# Patient Record
Sex: FEMALE | Race: WHITE | NOT HISPANIC OR LATINO | Employment: UNEMPLOYED | URBAN - NONMETROPOLITAN AREA
[De-identification: names, ages, dates, MRNs, and addresses within clinical notes are randomized per-mention and may not be internally consistent; named-entity substitution may affect disease eponyms.]

---

## 2024-07-24 ENCOUNTER — TELEPHONE (OUTPATIENT)
Age: 24
End: 2024-07-24

## 2024-07-24 NOTE — TELEPHONE ENCOUNTER
Caller: Taya Leonard    Relationship: Self    Best call back number: [phone]    What is the best time to reach you: ANYTIME     Who are you requesting to speak with (clinical staff, provider,  specific staff member): MARI     Do you know the name of the person who called: MARI     What was the call regarding: UPCOMING APPT PT RETURNING MISSED CALL PLEASE CALL

## 2024-08-07 ENCOUNTER — INITIAL PRENATAL (OUTPATIENT)
Age: 24
End: 2024-08-07
Payer: COMMERCIAL

## 2024-08-07 VITALS — WEIGHT: 97.2 LBS | DIASTOLIC BLOOD PRESSURE: 60 MMHG | BODY MASS INDEX: 16.68 KG/M2 | SYSTOLIC BLOOD PRESSURE: 99 MMHG

## 2024-08-07 DIAGNOSIS — Z34.81 ENCOUNTER FOR SUPERVISION OF OTHER NORMAL PREGNANCY IN FIRST TRIMESTER: ICD-10-CM

## 2024-08-07 DIAGNOSIS — Z34.90 PREGNANCY WITH FETUS OF UNKNOWN GESTATIONAL AGE: Primary | ICD-10-CM

## 2024-08-07 PROCEDURE — G0123 SCREEN CERV/VAG THIN LAYER: HCPCS | Performed by: OBSTETRICS & GYNECOLOGY

## 2024-08-07 PROCEDURE — 87591 N.GONORRHOEAE DNA AMP PROB: CPT | Performed by: OBSTETRICS & GYNECOLOGY

## 2024-08-07 PROCEDURE — 87491 CHLMYD TRACH DNA AMP PROBE: CPT | Performed by: OBSTETRICS & GYNECOLOGY

## 2024-08-07 PROCEDURE — 87661 TRICHOMONAS VAGINALIS AMPLIF: CPT | Performed by: OBSTETRICS & GYNECOLOGY

## 2024-08-07 NOTE — PROGRESS NOTES
NEW OBSTETRIC HISTORY AND PHYSICAL     Subjective:  Taya Jackson is a 23 y.o.  at 8w1d with Estimated Date of Delivery: 3/18/25 here for her new OB visit. Patient pregnancy is dated by last menstrual period and confirmed with a first trimester ultrasound. She is taking her prenatal vitamins.Reports no loss of fluid or vaginal bleeding.No hx of genetic, bleeding, endocrine, chromosome disorder in both patient and partner. No history of multiple gestations, congenital anomalies or mental retardation.    Hx of STIs including Herpes: No  Hx of abnormal PAP smear: No      Discussed adequate water intake, food guidelines/weight gain, limit caffiene to less than 200mg daily.   Discussed food, activities to avoid. Discussed seatbelt safety.   Reviewed safe meds in pregnancy handout.  Taking PNV: Yes  Smoking cessation needed: No    Reviewed and updated:  OBHx, GYNHx (STDs), PMHx, Medications, Allergies, PSHx, Social Hx, Preventative Hx (PAP), Hx of abuse/safe environment, Vaccine Hx including hx of chickenpox or vaccine, Genetic Hx (pt, FOB, both families).        OB History    Para Term  AB Living   4 2 2   1 2   SAB IAB Ectopic Molar Multiple Live Births   1         2      # Outcome Date GA Lbr Chase/2nd Weight Sex Type Anes PTL Lv   4 Current            3 SAB 22 4w0d   U    FD   2 Term 20 38w0d  2722 g (6 lb) F Vag-Spont EPI N THU   1 Term 18 37w0d  2495 g (5 lb 8 oz) F Vag-Spont EPI N THU       Past Medical History:   Diagnosis Date    GERD (gastroesophageal reflux disease) 2023    Raynaud's phenomenon        Past Surgical History:   Procedure Laterality Date    COLONOSCOPY  2024    COLONOSCOPY N/A 3/26/2024    Procedure: COLONOSCOPY;  Surgeon: Darwin Cid MD;  Location: [facility] ENDOSCOPY;  Service: Gastroenterology;  Laterality: N/A;  NORMAL COLONOSCOPY    ENDOSCOPY  2024    ENDOSCOPY N/A 3/26/2024    Procedure: ESOPHAGOGASTRODUODENOSCOPY WITH  BIOPSIES;  Surgeon: Darwin Cid MD;  Location: [facility] ENDOSCOPY;  Service: Gastroenterology;  Laterality: N/A;  NORMAL ENDOSCOPY         Current Outpatient Medications:     omeprazole (priLOSEC) 20 MG capsule, Take 1 capsule by mouth Daily., Disp: 30 capsule, Rfl: 5    Family History   Problem Relation Age of Onset    Prostate cancer Paternal Grandfather     Colon cancer Maternal Grandfather     Breast cancer Neg Hx     Ovarian cancer Neg Hx     Uterine cancer Neg Hx        Allergies   Allergen Reactions    Amoxicillin Rash       Social History     Socioeconomic History    Marital status:     Number of children: 2   Tobacco Use    Smoking status: Never     Passive exposure: Never    Smokeless tobacco: Never   Vaping Use    Vaping status: Never Used   Substance and Sexual Activity    Alcohol use: Never    Drug use: Never    Sexual activity: Yes     Partners: Male     Birth control/protection: Condom       Last Completed Pap Smear            PAP SMEAR (Every 3 Years) Next due on 11/21/2026 11/21/2023  IGP,rfx Aptima HPV All Pth                      ROS:  General ROS: negative for - chills or fatigue  Gastrointestinal ROS: negative for - abdominal pain or appetite loss    Objective:    Vitals:    08/07/24 1536   BP: 99/60               Physical Exam:   General appearance - alert, well appearing, and in no distress  Respiratory- No labored breathing  Breasts-tender to palpation bilateral.  No discharge.  There is a small cystic mass in the left breast at about 1:00 which is tender to palpation and mobile.  Patient reports feeling the same mass during her last pregnancy.  Abdomen- Soft, Gravid uterus, non-tender  Extremeties: Normal ROM, Negative swelling     Pelvic exam with chaperone present:   External genitalia- Normal, no lesions  Urethra- Normal, no masses, non tender  Vagina- Normal, no discharge  Bladder- Normal, no masses, non tender  Cervix- Normal, no lesions, no discharge, no  CMT  Uterus- Normal shape and consistency, non tender.    Adnexa- Normal, no mass, non tender    Bedside Ultrasound:     Brief Urine Lab Results  (Last result in the past 365 days)        Color   Clarity   Blood   Leuk Est   Nitrite   Protein   CREAT   Urine HCG        03/26/24 0911               Negative                Counseling:   Nutrition discussed, calories, activity/exercise in pregnancy  Discussed dietary restrictions/safety food preparation in pregnancy  Reviewed what to expect prenatal visits, office providers (female and male) and covering [facility] Hospital[facility]/Dr. Garcia  Appropriate trimester precautions provided, N/V, vag bleeding, cramping  VACCINE importance in pregnancy discussed.  Maternal and fetal risk of not being vaccinated reviewed NLT increased risk maternal/fetal severity of illness/death, PTD, CS, hemorrhage, HTN, possible IUFD.  Significant maternal and fetal/infant benefit w vaccination.  FDA approval and ACOG/SMFM/CDC strong recommendation in pregnancy.  Questions answered.   Questions answered  Discussed healthy weight gain.  Also discussed increased water intake, 200mg of caffeine daily, exercise and healthy eating habits.  Encouraged patient to consider breastfeeding. Discussed that it is recommended by the CDC and WHO that women exclusively breastfeed for the first 6 months and continue to breastfeed up to one year. Discussed the health benefits of breastfeeding, including increased immune systems in infants, reduced risk of food allergies, and reduced risk of chronic disease as an adult. Maternal benefits include more PP weight loss, reduced risk of PP depression, breast/ovarian cancer risk reduced.     ASSESSMENT/PLAN:   Assessment & Plan  Pregnancy with fetus of unknown gestational age    Encounter for supervision of other normal pregnancy in first trimester    Orders Placed This Encounter   Procedures    US Ob < 14 Weeks Single or First Gestation                Return in about 4 weeks  (around 9/4/2024).    We have gone over the expected prenatal care to include the timing and content of visits including the anatomy ultrasound.  We discussed the content of the anatomy ultrasound and its limitations (the fact that ultrasound in general may not see up to 40% of abnormalities).  I informed her how to contact the office and/or on call person in the event of any problems and encouraged her to do so when she feels it is necessary.  We then spent time answering her questions which she indicated were answered to her satisfaction.    Cale Del Rio MD  8/7/2024 15:51 EDTNEW OBSTETRIC HISTORY AND PHYSICAL

## 2024-08-08 ENCOUNTER — LAB (OUTPATIENT)
Dept: LAB | Facility: HOSPITAL | Age: 24
End: 2024-08-08
Payer: COMMERCIAL

## 2024-08-08 LAB
ABO GROUP BLD: NORMAL
BASOPHILS # BLD AUTO: 0.01 10*3/MM3 (ref 0–0.2)
BASOPHILS NFR BLD AUTO: 0.2 % (ref 0–1.5)
BLD GP AB SCN SERPL QL: NEGATIVE
DEPRECATED RDW RBC AUTO: 43.4 FL (ref 37–54)
EOSINOPHIL # BLD AUTO: 0.01 10*3/MM3 (ref 0–0.4)
EOSINOPHIL NFR BLD AUTO: 0.2 % (ref 0.3–6.2)
ERYTHROCYTE [DISTWIDTH] IN BLOOD BY AUTOMATED COUNT: 12.9 % (ref 12.3–15.4)
HBV SURFACE AG SERPL QL IA: NORMAL
HCT VFR BLD AUTO: 43.9 % (ref 34–46.6)
HCV AB SER QL: NORMAL
HGB BLD-MCNC: 14.7 G/DL (ref 12–15.9)
HIV 1+2 AB+HIV1 P24 AG SERPL QL IA: NORMAL
IMM GRANULOCYTES # BLD AUTO: 0 10*3/MM3 (ref 0–0.05)
IMM GRANULOCYTES NFR BLD AUTO: 0 % (ref 0–0.5)
LYMPHOCYTES # BLD AUTO: 1.44 10*3/MM3 (ref 0.7–3.1)
LYMPHOCYTES NFR BLD AUTO: 30.5 % (ref 19.6–45.3)
MCH RBC QN AUTO: 30.4 PG (ref 26.6–33)
MCHC RBC AUTO-ENTMCNC: 33.5 G/DL (ref 31.5–35.7)
MCV RBC AUTO: 90.7 FL (ref 79–97)
MONOCYTES # BLD AUTO: 0.24 10*3/MM3 (ref 0.1–0.9)
MONOCYTES NFR BLD AUTO: 5.1 % (ref 5–12)
NEUTROPHILS NFR BLD AUTO: 3.02 10*3/MM3 (ref 1.7–7)
NEUTROPHILS NFR BLD AUTO: 64 % (ref 42.7–76)
PLATELET # BLD AUTO: 218 10*3/MM3 (ref 140–450)
PMV BLD AUTO: 10.5 FL (ref 6–12)
RBC # BLD AUTO: 4.84 10*6/MM3 (ref 3.77–5.28)
RH BLD: POSITIVE
WBC NRBC COR # BLD AUTO: 4.72 10*3/MM3 (ref 3.4–10.8)

## 2024-08-08 PROCEDURE — 86803 HEPATITIS C AB TEST: CPT | Performed by: OBSTETRICS & GYNECOLOGY

## 2024-08-08 PROCEDURE — 85025 COMPLETE CBC W/AUTO DIFF WBC: CPT | Performed by: OBSTETRICS & GYNECOLOGY

## 2024-08-08 PROCEDURE — 80081 OBSTETRIC PANEL INC HIV TSTG: CPT | Performed by: OBSTETRICS & GYNECOLOGY

## 2024-08-09 ENCOUNTER — PATIENT ROUNDING (BHMG ONLY) (OUTPATIENT)
Age: 24
End: 2024-08-09
Payer: COMMERCIAL

## 2024-08-09 LAB
RPR SER QL: NORMAL
T VAGINALIS RRNA SPEC QL NAA+PROBE: NEGATIVE

## 2024-08-09 NOTE — PROGRESS NOTES
".\"A [employer] message has been sent to the patient for PATIENT ROUNDING with [facility]\"  "

## 2024-08-10 LAB — RUBV IGG SERPL IA-ACNC: 2.1 INDEX

## 2024-08-13 ENCOUNTER — TELEPHONE (OUTPATIENT)
Dept: FAMILY MEDICINE CLINIC | Age: 24
End: 2024-08-13
Payer: COMMERCIAL

## 2024-09-04 ENCOUNTER — ROUTINE PRENATAL (OUTPATIENT)
Age: 24
End: 2024-09-04
Payer: COMMERCIAL

## 2024-09-04 VITALS — SYSTOLIC BLOOD PRESSURE: 120 MMHG | BODY MASS INDEX: 16.86 KG/M2 | WEIGHT: 98.2 LBS | DIASTOLIC BLOOD PRESSURE: 86 MMHG

## 2024-09-04 DIAGNOSIS — Z34.91 NORMAL PREGNANCY IN FIRST TRIMESTER: Primary | ICD-10-CM

## 2024-09-04 LAB — PROT UR STRIP-MCNC: ABNORMAL MG/DL

## 2024-09-04 NOTE — PROGRESS NOTES
Routine Prenatal Visit     Subjective  Taya Jackson is a 23 y.o.  at 12w1d here for her routine OB visit.   She is taking her prenatal vitamins.Reports no loss of fluid or vaginal bleeding. Patient doing well without any complaints. Pregnancy complicated by:     Patient Active Problem List   Diagnosis    Lymph node enlargement    Generalized abdominal pain    Gastroesophageal reflux disease without esophagitis    Other dysphagia    Upper abdominal pain    Screening for viral disease    Blood in stool    Rectal bleeding    Dyspepsia    Dysphagia    Globus sensation    Pain of upper abdomen    FH: colon cancer         OB History    Para Term  AB Living   4 2 2   1 2   SAB IAB Ectopic Molar Multiple Live Births   1         2      # Outcome Date GA Lbr Chase/2nd Weight Sex Type Anes PTL Lv   4 Current            3 SAB 22 4w0d   U    FD   2 Term 20 38w0d  2722 g (6 lb) F Vag-Spont EPI N THU   1 Term 18 37w0d  2495 g (5 lb 8 oz) F Vag-Spont EPI N THU       ROS:  General ROS: negative for - chills or fatigue  Genito-Urinary ROS: negative for  change in urinary stream, vaginal discharge     Objective  Physical Exam:   Vitals:    24 1407   BP: 120/86       Uterine Size: size equals dates  FHT: 110-160 BPM    General appearance - alert, well appearing, and in no distress  Abdomen- soft, gravid uterus, non-tender to palpation  Extremeties: negative swelling       Assessment/Plan  Assessment & Plan  Normal pregnancy in first trimester  Patient wants to wait on doing genetic blood testing until next visit.              Counseling:     Round Ligament Pain:  The uterus has several ligaments which provide support and keep the uterus in place. As the  uterus grows these ligaments are pulled and stretched which often causes sharp stabbing like pain in the inguinal area.   You may find a pregnancy support band helpful. Changing positions may also help. Yoga is a great way to cope  with round ligament and low back pain in pregnancy.    Massage may also help with low back pain   Things to Consider at this Point in your Pregnancy:  Some women experience swelling in their feet during pregnancy. Compression stockings may help  Drink plenty of water and stay active   Make sure you are eating frequent small meals, nuts are a wonderful snack to keep with you            Return in about 4 weeks (around 10/2/2024) for Routine OB visit.      We have gone over prenatal care to include the timing and content of visits. I informed her how to contact the office and/or on call person in the event of any problems and encouraged her to do so when she feels it is necessary.  We then spent time answering her questions which she indicated were answered to her satisfaction.    Cale Del Rio MD  9/4/2024 14:24 EDT

## 2024-09-06 ENCOUNTER — TELEPHONE (OUTPATIENT)
Age: 24
End: 2024-09-06
Payer: COMMERCIAL

## 2024-09-06 LAB
EXPIRATION DATE: NORMAL
Lab: NORMAL
PROT UR STRIP-MCNC: NEGATIVE MG/DL

## 2024-09-06 NOTE — TELEPHONE ENCOUNTER
Patient saw in her MyChart an abn. ur. result I explained we check for pro & glu on OB patients, hers had trace but Dr. Crespo had no concerns due to the small amount. She feels like she is getting a UTI. She is coming in for a full ur test.       Patient did come in and a urine test was run. It was negative.

## 2024-10-02 ENCOUNTER — ROUTINE PRENATAL (OUTPATIENT)
Age: 24
End: 2024-10-02
Payer: COMMERCIAL

## 2024-10-02 VITALS — WEIGHT: 99.5 LBS | DIASTOLIC BLOOD PRESSURE: 87 MMHG | BODY MASS INDEX: 17.08 KG/M2 | SYSTOLIC BLOOD PRESSURE: 123 MMHG

## 2024-10-02 DIAGNOSIS — Z34.92 NORMAL PREGNANCY IN SECOND TRIMESTER: Primary | ICD-10-CM

## 2024-10-02 LAB
CLARITY, POC: CLEAR
COLOR UR: YELLOW
GLUCOSE UR STRIP-MCNC: NEGATIVE MG/DL
PROT UR STRIP-MCNC: NEGATIVE MG/DL

## 2024-10-02 NOTE — PROGRESS NOTES
Routine Prenatal Visit     Subjective  Taya Leonard is a 24 y.o.  at 16w1d here for her routine OB visit.   She is taking her prenatal vitamins.Reports no loss of fluid or vaginal bleeding. Patient doing well without any complaints except for concerns about weight gain.  She reports that her caloric intake is good but is just not gaining weight.  She has gluten allergy and she is not able to eat bread. Pregnancy is complicated by:     Patient Active Problem List   Diagnosis    Lymph node enlargement    Generalized abdominal pain    Gastroesophageal reflux disease without esophagitis    Other dysphagia    Upper abdominal pain    Screening for viral disease    Blood in stool    Rectal bleeding    Dyspepsia    Dysphagia    Globus sensation    Pain of upper abdomen    FH: colon cancer         OB History    Para Term  AB Living   4 2 2   1 2   SAB IAB Ectopic Molar Multiple Live Births   1         2      # Outcome Date GA Lbr Chase/2nd Weight Sex Type Anes PTL Lv   4 Current            3 SAB 22 4w0d   U    FD   2 Term 20 38w0d  2722 g (6 lb) F Vag-Spont EPI N THU   1 Term 18 37w0d  2495 g (5 lb 8 oz) F Vag-Spont EPI N THU           ROS:   General ROS: negative for - chills or fatigue  Genito-Urinary ROS: negative for  change in urinary stream, vaginal discharge     Objective  Physical Exam:   Vitals:    10/02/24 1132   BP: 123/87       Uterine Size: size equals dates  FHT: 110-160 BPM    General appearance - alert, well appearing, and in no distress  Abdomen- Soft, Gravid uterus, non-tender to palpation  Extremeties: negative swelling       Assessment/Plan:   Assessment & Plan  Normal pregnancy in second trimester     I discussed with patient genetic testing including panorama and Horizon testing.  Patient wants to wait on those test until her insurance is confirmed.  Patient reports that she will not do anything if the testing is abnormal anyhow.         Counseling:      Round Ligament Pain:  The uterus has several ligaments which provide support and keep the uterus in place. As the  uterus grows these ligaments are pulled and stretched which often causes sharp stabbing like pain in the inguinal area.   You may find a pregnancy support band helpful. Changing positions may also help. Yoga is a great way to cope with round ligament and low back pain in pregnancy.    Massage may also help with low back pain   Things to Consider at this Point in your Pregnancy:  Some women experience swelling in their feet during pregnancy. Compression stockings may help  Drink plenty of water and stay active   Make sure you are eating frequent small meals, nuts are a wonderful snack to keep with you            Return in about 4 weeks (around 10/30/2024) for Routine OB visit.      We have gone over prenatal care to include the timing and content of visits. I informed her how to contact the office and/or on call person in the event of any problems and encouraged her to do so when she feels it is necessary.  We then spent time answering her questions which she indicated were answered to her satisfaction.    Cale Del Rio MD  10/2/2024 11:49 EDT

## 2024-10-11 ENCOUNTER — TELEPHONE (OUTPATIENT)
Age: 24
End: 2024-10-11
Payer: COMMERCIAL

## 2024-10-11 ENCOUNTER — HOSPITAL ENCOUNTER (EMERGENCY)
Facility: HOSPITAL | Age: 24
Discharge: HOME | End: 2024-10-11
Attending: EMERGENCY MEDICINE
Payer: COMMERCIAL

## 2024-10-11 ENCOUNTER — APPOINTMENT (OUTPATIENT)
Dept: ULTRASOUND IMAGING | Facility: HOSPITAL | Age: 24
End: 2024-10-11
Payer: COMMERCIAL

## 2024-10-11 VITALS
HEART RATE: 85 BPM | TEMPERATURE: 97.6 F | SYSTOLIC BLOOD PRESSURE: 105 MMHG | OXYGEN SATURATION: 98 % | RESPIRATION RATE: 18 BRPM | DIASTOLIC BLOOD PRESSURE: 76 MMHG | HEIGHT: 64 IN | BODY MASS INDEX: 17.46 KG/M2 | WEIGHT: 102.29 LBS

## 2024-10-11 DIAGNOSIS — R82.71 BACTERIA IN URINE: ICD-10-CM

## 2024-10-11 DIAGNOSIS — R10.9 RIGHT FLANK PAIN: Primary | ICD-10-CM

## 2024-10-11 LAB
ALBUMIN SERPL-MCNC: 3.3 G/DL (ref 3.5–5.2)
ALBUMIN/GLOB SERPL: 0.8 G/DL
ALP SERPL-CCNC: 72 U/L (ref 39–117)
ALT SERPL W P-5'-P-CCNC: 12 U/L (ref 1–33)
ANION GAP SERPL CALCULATED.3IONS-SCNC: 10.6 MMOL/L (ref 5–15)
AST SERPL-CCNC: 13 U/L (ref 1–32)
BACTERIA UR QL AUTO: ABNORMAL /HPF
BASOPHILS # BLD AUTO: 0.03 10*3/MM3 (ref 0–0.2)
BASOPHILS NFR BLD AUTO: 0.4 % (ref 0–1.5)
BILIRUB SERPL-MCNC: 0.2 MG/DL (ref 0–1.2)
BILIRUB UR QL STRIP: NEGATIVE
BUN SERPL-MCNC: 9 MG/DL (ref 6–20)
BUN/CREAT SERPL: 14.5 (ref 7–25)
CALCIUM SPEC-SCNC: 9.3 MG/DL (ref 8.6–10.5)
CHLORIDE SERPL-SCNC: 101 MMOL/L (ref 98–107)
CLARITY UR: CLEAR
CO2 SERPL-SCNC: 23.4 MMOL/L (ref 22–29)
COLOR UR: YELLOW
CREAT SERPL-MCNC: 0.62 MG/DL (ref 0.57–1)
DEPRECATED RDW RBC AUTO: 47.5 FL (ref 37–54)
EGFRCR SERPLBLD CKD-EPI 2021: 126.1 ML/MIN/1.73
EOSINOPHIL # BLD AUTO: 0.05 10*3/MM3 (ref 0–0.4)
EOSINOPHIL NFR BLD AUTO: 0.7 % (ref 0.3–6.2)
ERYTHROCYTE [DISTWIDTH] IN BLOOD BY AUTOMATED COUNT: 14.1 % (ref 12.3–15.4)
GLOBULIN UR ELPH-MCNC: 3.9 GM/DL
GLUCOSE SERPL-MCNC: 95 MG/DL (ref 65–99)
GLUCOSE UR STRIP-MCNC: NEGATIVE MG/DL
HCT VFR BLD AUTO: 41 % (ref 34–46.6)
HGB BLD-MCNC: 13.8 G/DL (ref 12–15.9)
HGB UR QL STRIP.AUTO: NEGATIVE
HOLD SPECIMEN: NORMAL
HYALINE CASTS UR QL AUTO: ABNORMAL /LPF
IMM GRANULOCYTES # BLD AUTO: 0.02 10*3/MM3 (ref 0–0.05)
IMM GRANULOCYTES NFR BLD AUTO: 0.3 % (ref 0–0.5)
KETONES UR QL STRIP: NEGATIVE
LEUKOCYTE ESTERASE UR QL STRIP.AUTO: ABNORMAL
LYMPHOCYTES # BLD AUTO: 1.76 10*3/MM3 (ref 0.7–3.1)
LYMPHOCYTES NFR BLD AUTO: 25.9 % (ref 19.6–45.3)
MCH RBC QN AUTO: 31.1 PG (ref 26.6–33)
MCHC RBC AUTO-ENTMCNC: 33.7 G/DL (ref 31.5–35.7)
MCV RBC AUTO: 92.3 FL (ref 79–97)
MONOCYTES # BLD AUTO: 0.42 10*3/MM3 (ref 0.1–0.9)
MONOCYTES NFR BLD AUTO: 6.2 % (ref 5–12)
NEUTROPHILS NFR BLD AUTO: 4.52 10*3/MM3 (ref 1.7–7)
NEUTROPHILS NFR BLD AUTO: 66.5 % (ref 42.7–76)
NITRITE UR QL STRIP: NEGATIVE
NRBC BLD AUTO-RTO: 0 /100 WBC (ref 0–0.2)
PH UR STRIP.AUTO: 6 [PH] (ref 5–8)
PLATELET # BLD AUTO: 239 10*3/MM3 (ref 140–450)
PMV BLD AUTO: 11.1 FL (ref 6–12)
POTASSIUM SERPL-SCNC: 4 MMOL/L (ref 3.5–5.2)
PROT SERPL-MCNC: 7.2 G/DL (ref 6–8.5)
PROT UR QL STRIP: NEGATIVE
RBC # BLD AUTO: 4.44 10*6/MM3 (ref 3.77–5.28)
RBC # UR STRIP: ABNORMAL /HPF
REF LAB TEST METHOD: ABNORMAL
SODIUM SERPL-SCNC: 135 MMOL/L (ref 136–145)
SP GR UR STRIP: <=1.005 (ref 1–1.03)
SQUAMOUS #/AREA URNS HPF: ABNORMAL /HPF
UROBILINOGEN UR QL STRIP: ABNORMAL
WBC # UR STRIP: ABNORMAL /HPF
WBC NRBC COR # BLD AUTO: 6.8 10*3/MM3 (ref 3.4–10.8)

## 2024-10-11 PROCEDURE — 76775 US EXAM ABDO BACK WALL LIM: CPT

## 2024-10-11 PROCEDURE — 36415 COLL VENOUS BLD VENIPUNCTURE: CPT

## 2024-10-11 PROCEDURE — 85025 COMPLETE CBC W/AUTO DIFF WBC: CPT | Performed by: EMERGENCY MEDICINE

## 2024-10-11 PROCEDURE — 99284 EMERGENCY DEPT VISIT MOD MDM: CPT

## 2024-10-11 PROCEDURE — 80053 COMPREHEN METABOLIC PANEL: CPT | Performed by: EMERGENCY MEDICINE

## 2024-10-11 PROCEDURE — 81001 URINALYSIS AUTO W/SCOPE: CPT | Performed by: EMERGENCY MEDICINE

## 2024-10-11 RX ORDER — ONDANSETRON 4 MG/1
4 TABLET, ORALLY DISINTEGRATING ORAL EVERY 8 HOURS PRN
Qty: 10 TABLET | Refills: 0 | Status: SHIPPED | OUTPATIENT
Start: 2024-10-11

## 2024-10-11 RX ORDER — CEPHALEXIN 500 MG/1
500 CAPSULE ORAL 4 TIMES DAILY
Qty: 28 CAPSULE | Refills: 0 | Status: SHIPPED | OUTPATIENT
Start: 2024-10-11 | End: 2024-10-18

## 2024-10-11 RX ADMIN — CEPHALEXIN 500 MG: 250 CAPSULE ORAL at 21:34

## 2024-10-11 NOTE — TELEPHONE ENCOUNTER
Provider: DR MCDONOUGH     Caller: RCISTINE GROSS    Relationship to Patient: SELF     Pharmacy: [facility]    Phone Number: [phone]    Reason for Call: PT IS ABOUT 17 WKS PREGNANT AND HAS BEEN HAVING BACK PAIN ON THE RIGHT SIDE SINCE TUESDAY IT HAS GRADUALLY GOTTEN WORSE IT HAS NOW EXTENDED LOWER DOWN INTO HER LOWER BACK PT IS NOT SURE IF IT COULD BE A KIDNEY INFECTION OR WHAT PT WOULD LIKE TO BE ADVISED

## 2024-10-11 NOTE — TELEPHONE ENCOUNTER
Spoke to patient that stated she has started to also have chills and is nauseas. Dr. Del Rio said for her to go to ER. Patient voiced understanding.

## 2024-10-11 NOTE — ED PROVIDER NOTES
Time: 7:05 PM EDT  Date of encounter:  10/11/2024  Independent Historian/Clinical History and Information was obtained by:   Patient    History is limited by: N/A    Chief Complaint   Patient presents with    Flank Pain         History of Present Illness:  Patient is a 24 y.o. year old female who presents to the emergency department for evaluation of flank pain.  Patient states the pain began Tuesday has increased in intensity.  Patient is also complaining of some constipation.  Patient is 17 weeks pregnant.  Patient denies any urological symptoms or vaginal bleeding or discharge.    Patient Care Team  Primary Care Provider: Rhina Leonard APRN    Past Medical History:     Allergies   Allergen Reactions    Amoxicillin Rash     Past Medical History:   Diagnosis Date    GERD (gastroesophageal reflux disease) Jan 2023    Raynaud's phenomenon      Past Surgical History:   Procedure Laterality Date    COLONOSCOPY  03/26/2024    COLONOSCOPY N/A 3/26/2024    Procedure: COLONOSCOPY;  Surgeon: Darwin Cid MD;  Location: [facility] ENDOSCOPY;  Service: Gastroenterology;  Laterality: N/A;  NORMAL COLONOSCOPY    ENDOSCOPY  03/26/2024    ENDOSCOPY N/A 3/26/2024    Procedure: ESOPHAGOGASTRODUODENOSCOPY WITH BIOPSIES;  Surgeon: Darwin Cid MD;  Location: [facility] ENDOSCOPY;  Service: Gastroenterology;  Laterality: N/A;  NORMAL ENDOSCOPY     Family History   Problem Relation Age of Onset    Prostate cancer Paternal Grandfather     Colon cancer Maternal Grandfather     Breast cancer Neg Hx     Ovarian cancer Neg Hx     Uterine cancer Neg Hx        Home Medications:  Prior to Admission medications    Medication Sig Start Date End Date Taking? Authorizing Provider   omeprazole (priLOSEC) 20 MG capsule Take 1 capsule by mouth Daily.  Patient not taking: Reported on 9/4/2024 3/26/24   Darwin Cid MD        Social History:   Social History     Tobacco Use    Smoking status: Never     Passive exposure: Never     "Smokeless tobacco: Never   Vaping Use    Vaping status: Never Used   Substance Use Topics    Alcohol use: Never    Drug use: Never         Review of Systems:  Review of Systems   Constitutional:  Negative for chills and fever.   Respiratory:  Negative for shortness of breath.    Cardiovascular:  Negative for chest pain.   Gastrointestinal:  Negative for abdominal pain, diarrhea, nausea and vomiting.   Genitourinary:  Positive for flank pain and frequency. Negative for dysuria, pelvic pain, vaginal bleeding and vaginal discharge.   Musculoskeletal:  Negative for back pain and neck pain.   Skin:  Negative for rash.   Neurological: Negative.    Hematological: Negative.    Psychiatric/Behavioral: Negative.     All other systems reviewed and are negative.       Physical Exam:  /78 (BP Location: Left arm, Patient Position: Sitting)   Pulse 88   Temp 97.6 °F (36.4 °C) (Oral)   Resp 17   Ht 162.6 cm (64\")   Wt 46.4 kg (102 lb 4.7 oz)   LMP 06/11/2024 (Exact Date)   SpO2 100%   BMI 17.56 kg/m²         Physical Exam  Vitals and nursing note reviewed.   Constitutional:       Appearance: Normal appearance.   HENT:      Head: Atraumatic.   Cardiovascular:      Rate and Rhythm: Normal rate and regular rhythm.      Pulses: Normal pulses.   Pulmonary:      Effort: Pulmonary effort is normal.      Breath sounds: Normal breath sounds.   Chest:      Chest wall: No tenderness.   Abdominal:      General: Bowel sounds are normal.      Palpations: Abdomen is soft.      Tenderness: There is no abdominal tenderness. There is no right CVA tenderness or left CVA tenderness.   Musculoskeletal:         General: Tenderness (right thoracic/ upper right lumbar soft tissue) present.      Cervical back: Normal range of motion.      Thoracic back: Tenderness present.        Back:    Skin:     General: Skin is warm and dry.   Neurological:      Mental Status: She is alert and oriented to person, place, and time.   Psychiatric:         " Mood and Affect: Mood normal.         Behavior: Behavior normal.                      Medical Decision Making:      Comorbidities that affect care:    GERD, Pregnancy    External Notes reviewed:    Previous Clinic Note: last visit oct 2 with OB/GYN routine 16-week 1 day visit      The following orders were placed and all results were independently analyzed by me:  Orders Placed This Encounter   Procedures    US Renal Bilateral    Comprehensive Metabolic Panel    Urinalysis With Microscopic If Indicated (No Culture) - Urine, Clean Catch    CBC Auto Differential    Urinalysis, Microscopic Only - Urine, Clean Catch    Monitor fetal heart tones    CBC & Differential    Extra Tubes    Gold Top - SST       Medications Given in the Emergency Department:  Medications   cephalexin (KEFLEX) capsule 500 mg (has no administration in time range)        ED Course:    The patient was initially evaluated in the triage area where orders were placed. The patient was later dispositioned by MICHAEL Goel.      The patient was advised to stay for completion of workup which includes but is not limited to communication of labs and radiological results, reassessment and plan. The patient was advised that leaving prior to disposition by a provider could result in critical findings that are not communicated to the patient.     ED Course as of 10/11/24 2118   Fri Oct 11, 2024   1908 PROVIDER IN TRIAGE  Patient was evaluated by me in triage, Bailey Seaver, APRN, VIJAY.  Orders were placed and patient is currently awaiting final results and disposition.   [AS]   1943 I offer the patient Tylenol or other pregnancy safe medication for pain but she has declined at this time.  I advised her to let me know if she changes her mind [DS]      ED Course User Index  [AS] Seaver, Alyce B, APRN  [DS] Emma Stone APRN       Labs:    Lab Results (last 24 hours)       Procedure Component Value Units Date/Time    CBC & Differential [[id number]]  (Normal)  Collected: 10/11/24 1911    Specimen: Blood from Arm, Right Updated: 10/11/24 1932    Narrative:      The following orders were created for panel order CBC & Differential.  Procedure                               Abnormality         Status                     ---------                               -----------         ------                     CBC Auto Differential[[id number]]        Normal              Final result                 Please view results for these tests on the individual orders.    Comprehensive Metabolic Panel [[id number]]  (Abnormal) Collected: 10/11/24 1911    Specimen: Blood from Arm, Right Updated: 10/11/24 1957     Glucose 95 mg/dL      BUN 9 mg/dL      Creatinine 0.62 mg/dL      Sodium 135 mmol/L      Potassium 4.0 mmol/L      Chloride 101 mmol/L      CO2 23.4 mmol/L      Calcium 9.3 mg/dL      Total Protein 7.2 g/dL      Albumin 3.3 g/dL      ALT (SGPT) 12 U/L      AST (SGOT) 13 U/L      Alkaline Phosphatase 72 U/L      Total Bilirubin 0.2 mg/dL      Globulin 3.9 gm/dL      A/G Ratio 0.8 g/dL      BUN/Creatinine Ratio 14.5     Anion Gap 10.6 mmol/L      eGFR 126.1 mL/min/1.73     Narrative:      GFR Normal >60  Chronic Kidney Disease <60  Kidney Failure <15      CBC Auto Differential [[id number]]  (Normal) Collected: 10/11/24 1911    Specimen: Blood from Arm, Right Updated: 10/11/24 1932     WBC 6.80 10*3/mm3      RBC 4.44 10*6/mm3      Hemoglobin 13.8 g/dL      Hematocrit 41.0 %      MCV 92.3 fL      MCH 31.1 pg      MCHC 33.7 g/dL      RDW 14.1 %      RDW-SD 47.5 fl      MPV 11.1 fL      Platelets 239 10*3/mm3      Neutrophil % 66.5 %      Lymphocyte % 25.9 %      Monocyte % 6.2 %      Eosinophil % 0.7 %      Basophil % 0.4 %      Immature Grans % 0.3 %      Neutrophils, Absolute 4.52 10*3/mm3      Lymphocytes, Absolute 1.76 10*3/mm3      Monocytes, Absolute 0.42 10*3/mm3      Eosinophils, Absolute 0.05 10*3/mm3      Basophils, Absolute 0.03 10*3/mm3      Immature Grans, Absolute 0.02 10*3/mm3       nRBC 0.0 /100 WBC     Urinalysis With Microscopic If Indicated (No Culture) - Urine, Clean Catch [[id number]]  (Abnormal) Collected: 10/11/24 1915    Specimen: Urine, Clean Catch Updated: 10/11/24 1937     Color, UA Yellow     Appearance, UA Clear     pH, UA 6.0     Specific Gravity, UA <=1.005     Glucose, UA Negative     Ketones, UA Negative     Bilirubin, UA Negative     Blood, UA Negative     Protein, UA Negative     Leuk Esterase, UA Trace     Nitrite, UA Negative     Urobilinogen, UA 0.2 E.U./dL    Urinalysis, Microscopic Only - Urine, Clean Catch [[id number]]  (Abnormal) Collected: 10/11/24 1915    Specimen: Urine, Clean Catch Updated: 10/11/24 1950     RBC, UA None Seen /HPF      WBC, UA 0-2 /HPF      Bacteria, UA Trace /HPF      Squamous Epithelial Cells, UA 0-2 /HPF      Hyaline Casts, UA 0-2 /LPF      Methodology Manual Light Microscopy             Imaging:    US Renal Bilateral    Result Date: 10/11/2024  US RENAL BILATERAL Date of Exam: 10/11/2024 8:12 PM EDT Indication: right flank pain. Comparison: No comparisons available. Technique: Grayscale and color Doppler ultrasound evaluation of the kidneys and urinary bladder was performed. Findings: Right kidney measures 11.3 x 4.3 x 3.5 cm and the left 11.3 x 4 x 4 cm. Mild dilation of the right renal pelvis and calyces. Minimal prominence of the left renal pelvis. No solid-appearing masses or echogenic shadowing stone. No bladder wall thickening or layering intraluminal debris. Incidental mild increased hepatic echogenicity, suggestive but not diagnostic of steatosis.     Impression: 1. Mild right hydronephrosis which may relate to pregnancy. 2. Minimal left pelviectasis without overt hydronephrosis. Electronically Signed: Jus Dewitt MD  10/11/2024 8:54 PM EDT  Workstation ID: [id number]       Differential Diagnosis and Discussion:      Flank Pain: Differential diagnosis includes but is not limited to kidney stones, pyelonephritis, musculoskeletal  disorders, renal infarction, urinary tract infection, hydronephrosis, radiculopathy, aortic aneurysm, renal cell carcinoma.    All labs were reviewed and interpreted by me.  Ultrasound impression was interpreted by me.     MDM  Number of Diagnoses or Management Options  Bacteria in urine  Right flank pain  Diagnosis management comments: I have explained the patient´s condition, diagnoses and treatment plan based on the information available to me at this time. I have answered questions and addressed any concerns. The patient has a good  understanding of the patient´s diagnosis, condition, and treatment plan as can be expected at this point. The vital signs have been stable. The patient´s condition is stable and appropriate for discharge from the emergency department.      The patient will pursue further outpatient evaluation with the primary care physician or other designated or consulting physician as outlined in the discharge instructions. They are agreeable to this plan of care and follow-up instructions have been explained in detail. The patient has received these instructions in written format and have expressed an understanding of the discharge instructions. The patient is aware that any significant change in condition or worsening of symptoms should prompt an immediate return to this or the closest emergency department or call to 911.       Amount and/or Complexity of Data Reviewed  Clinical lab tests: reviewed and ordered  Tests in the radiology section of CPT®: reviewed and ordered  Tests in the medicine section of CPT®: ordered and reviewed    Risk of Complications, Morbidity, and/or Mortality  Presenting problems: moderate  Diagnostic procedures: moderate  Management options: low    Patient Progress  Patient progress: stable           Patient Care Considerations:    CT ABDOMEN AND PELVIS: I considered ordering a CT scan of the abdomen and pelvis however the risk of exposure of radiation to the fetus does  not exceed the benefits.  Patient does not appear to be in significant pain and has no hematuria and has normal lab work did not indicate any signs of infection or obstruction      Consultants/Shared Management Plan:    None    Social Determinants of Health:    Patient is independent, reliable, and has access to care.       Disposition and Care Coordination:    Discharged: The patient is suitable and stable for discharge with no need for consideration of admission.    I have explained the patient´s condition, diagnoses and treatment plan based on the information available to me at this time. I have answered questions and addressed any concerns. The patient has a good  understanding of the patient´s diagnosis, condition, and treatment plan as can be expected at this point. The vital signs have been stable. The patient´s condition is stable and appropriate for discharge from the emergency department.      The patient will pursue further outpatient evaluation with the primary care physician or other designated or consulting physician as outlined in the discharge instructions. They are agreeable to this plan of care and follow-up instructions have been explained in detail. The patient has received these instructions in written format and has expressed an understanding of the discharge instructions. The patient is aware that any significant change in condition or worsening of symptoms should prompt an immediate return to this or the closest emergency department or call to 911.  I have explained discharge medications and the need for follow up with the patient/caretakers. This was also printed in the discharge instructions. Patient was discharged with the following medications and follow up:      Medication List        New Prescriptions      cephalexin 500 MG capsule  Commonly known as: KEFLEX  Take 1 capsule by mouth 4 (Four) Times a Day for 7 days.     ondansetron ODT 4 MG disintegrating tablet  Commonly known as:  ZOFRAN-ODT  Place 1 tablet on the tongue Every 8 (Eight) Hours As Needed for Nausea or Vomiting.               Where to Get Your Medications        These medications were sent to [facility] [id number] - [place], [place] - [address] - [phone]  - [phone]  [address], [facility] [address]      Phone: [phone]   cephalexin 500 MG capsule  ondansetron ODT 4 MG disintegrating tablet      Cale Del Rio MD  [address] [place] [id number] [phone]    Schedule an appointment as soon as possible for a visit          Final diagnoses:   Right flank pain   Bacteria in urine        ED Disposition       ED Disposition   Discharge    Condition   Stable    Comment   --               This medical record created using voice recognition software.             Emma Stone, APRN  10/11/24 3390

## 2024-10-12 NOTE — DISCHARGE INSTRUCTIONS
Drink plenty of fluids.    Tylenol for pain.    Moist heat to affected area may help in case the pain is musculoskeletal as we talked about.    Return for new or worsening symptoms including but not limited to development of fever, intractable pain, or inability to tolerate anything by mouth

## 2024-10-14 ENCOUNTER — TELEPHONE (OUTPATIENT)
Age: 24
End: 2024-10-14
Payer: COMMERCIAL

## 2024-10-14 NOTE — TELEPHONE ENCOUNTER
CRISTINE GROSS    [phone]    PT IS 18wks    PT WAS IN THE ER 10/11/24 (NOTES IN CHART) WAS GIVEN CEPHALEXIN  4x A DAY FOR 7 DAYS    WAS GIVEN 2WHILE IN ER     WANTS TO MAKE ITS SAFE TO TAKE. PT DID TAKE 1 THIS MORNING

## 2024-10-15 ENCOUNTER — TELEPHONE (OUTPATIENT)
Age: 24
End: 2024-10-15

## 2024-10-15 NOTE — TELEPHONE ENCOUNTER
Hub staff attempted to follow warm transfer process and was unsuccessful     Caller: Taya Leonard    Relationship to patient: Self    Best call back number: [phone] (home)       Patient is needing: PT CALLING IN, WENT TO ER FOR UTI WAS GIVEN CEPHALEXIN AND WAS TOLD TO FOLLOW UP FOR NIKITA WITH OBGYN. HUB UNABLE TO WT TO CLARIFY TIME FRAME. PT STARTED TAKING THEM YESTERDAY AND WAS GIVEN A WEEK'S SUPPLY. OK TO RESPOND THROUGH [facility]

## 2024-11-06 ENCOUNTER — ROUTINE PRENATAL (OUTPATIENT)
Age: 24
End: 2024-11-06
Payer: COMMERCIAL

## 2024-11-06 VITALS — WEIGHT: 107.8 LBS | SYSTOLIC BLOOD PRESSURE: 119 MMHG | DIASTOLIC BLOOD PRESSURE: 75 MMHG | BODY MASS INDEX: 18.5 KG/M2

## 2024-11-06 DIAGNOSIS — Z34.92 NORMAL PREGNANCY IN SECOND TRIMESTER: Primary | ICD-10-CM

## 2024-11-06 LAB
GLUCOSE UR STRIP-MCNC: NEGATIVE MG/DL
PROT UR STRIP-MCNC: NEGATIVE MG/DL

## 2024-11-06 RX ORDER — PRENATAL VIT NO.126/IRON/FOLIC 28MG-0.8MG
1 TABLET ORAL DAILY
COMMUNITY

## 2024-11-06 NOTE — PROGRESS NOTES
Routine Prenatal Visit     Subjective  Taya Leonard is a 24 y.o.  at 21w1d here for her routine OB visit.   She is taking her prenatal vitamins.Reports no loss of fluid or vaginal bleeding. Patient doing well without any complaints. Pregnancy is complicated by:     Patient Active Problem List   Diagnosis    Lymph node enlargement    Generalized abdominal pain    Gastroesophageal reflux disease without esophagitis    Other dysphagia    Upper abdominal pain    Screening for viral disease    Blood in stool    Rectal bleeding    Dyspepsia    Dysphagia    Globus sensation    Pain of upper abdomen    FH: colon cancer         OB History    Para Term  AB Living   4 2 2   1 2   SAB IAB Ectopic Molar Multiple Live Births   1         2      # Outcome Date GA Lbr Chase/2nd Weight Sex Type Anes PTL Lv   4 Current            3 SAB 22 4w0d   U    FD   2 Term 20 38w0d  2722 g (6 lb) F Vag-Spont EPI N THU   1 Term 18 37w0d  2495 g (5 lb 8 oz) F Vag-Spont EPI N THU           ROS:  General ROS: negative for - chills or fatigue  Genito-Urinary ROS: negative for  change in urinary stream, vaginal discharge     Objective  Physical Exam:   Vitals:    24 1034   BP: 119/75       Uterine Size: size equals dates  FHT: 110-160 BPM    General appearance - alert, well appearing, and in no distress  Abdomen- Soft, Gravid uterus, non-tender to palpation  Extremeties: negative swelling       Assessment/Plan:   Assessment & Plan  Normal pregnancy in second trimester  Patient is here for follow-up OB visit.  She did not have a genetic screening done previously.  She declined maternal serum alpha-fetoprotein but desires panorama and Horizon.  Orders:    US Ob Detail Fetal Anatomy Single or First Gestation; Future    [facility] Panorama Prenatal Test: Chromosomes 13, 18, 21, X & Y: Triploidy 22Q.11.2 Deletion - Blood,; Future    [facility] Horizon 14 (Pan-Ethnic Standard) - Blood,; Future      Counseling:      Round Ligament Pain:  The uterus has several ligaments which provide support and keep the uterus in place. As the  uterus grows these ligaments are pulled and stretched which often causes sharp stabbing like pain in the inguinal area.   You may find a pregnancy support band helpful. Changing positions may also help. Yoga is a great way to cope with round ligament and low back pain in pregnancy.    Massage may also help with low back pain   Things to Consider at this Point in your Pregnancy:  Some women experience swelling in their feet during pregnancy. Compression stockings may help  Drink plenty of water and stay active   Make sure you are eating frequent small meals, nuts are a wonderful snack to keep with you            Return in about 4 weeks (around 12/4/2024) for Routine OB visit.      We have gone over prenatal care to include the timing and content of visits. I informed her how to contact the office and/or on call person in the event of any problems and encouraged her to do so when she feels it is necessary.  We then spent time answering her questions which she indicated were answered to her satisfaction.    Cale Del Rio MD  11/6/2024 10:50 EST

## 2024-11-19 ENCOUNTER — TELEPHONE (OUTPATIENT)
Age: 24
End: 2024-11-19
Payer: COMMERCIAL

## 2024-11-19 NOTE — TELEPHONE ENCOUNTER
Spoke to patient to let her know that she will need to call her insurance to see if it will cover the Prenatal testing [facility] and Nelda. If it does not cover the testing the company has a  form that she can fill out to help with the cost or cover the cost completely. Patient said she will call her insurance.She asked if it was required and I let her know that  providers have these test performed on all new OB's. She voiced understanding and had no other questions.

## 2024-11-19 NOTE — TELEPHONE ENCOUNTER
Attempted to call patient with the CPT codes for the lab testing:  Z31.430  Z84.81  Z81.0  Left a voicemail to call the office for additional information.

## 2024-11-19 NOTE — TELEPHONE ENCOUNTER
Caller: Taya Leonard    Relationship to patient: Self    Best call back number: [phone] (home)       Patient is needing: PT HAS PANORAMA PRENATAL AND HORIZON TESTING SCHEDULED AND IS WANTING TO KNOW IF INSURANCE COVERS THIS. IF IT DOESN'T, SHE WANTS TO KNOW IF THIS IS REQUIRED.

## 2024-11-19 NOTE — TELEPHONE ENCOUNTER
Caller: Taya Leonard     Relationship to patient: Self     Best call back number: [phone] (home)       Patient is needing: PT CALLED BACK STATING THAT HER INSURANCE IN NEEDING THE CPT CODING FOR THESE TEST IN ORDER TO GIVE HER THE COST    PLEASE CALL THE PT TO LET HER KNOW

## 2024-11-22 ENCOUNTER — TELEPHONE (OUTPATIENT)
Age: 24
End: 2024-11-22
Payer: COMMERCIAL

## 2024-11-22 NOTE — TELEPHONE ENCOUNTER
LVM - Dr. Del Rio said she does not have to have the test if she doesn't want to. Also, Tian offers fin. asst. if she would like information on that.

## 2024-12-04 ENCOUNTER — ROUTINE PRENATAL (OUTPATIENT)
Age: 24
End: 2024-12-04
Payer: COMMERCIAL

## 2024-12-04 VITALS — SYSTOLIC BLOOD PRESSURE: 105 MMHG | DIASTOLIC BLOOD PRESSURE: 76 MMHG | WEIGHT: 114.6 LBS | BODY MASS INDEX: 19.67 KG/M2

## 2024-12-04 DIAGNOSIS — O44.40 LOW-LYING PLACENTA: ICD-10-CM

## 2024-12-04 DIAGNOSIS — Z34.92 NORMAL PREGNANCY IN SECOND TRIMESTER: Primary | ICD-10-CM

## 2024-12-04 LAB
GLUCOSE UR STRIP-MCNC: NEGATIVE MG/DL
PROT UR STRIP-MCNC: NEGATIVE MG/DL

## 2024-12-04 NOTE — PROGRESS NOTES
Routine Prenatal Visit     Subjective  Taya Leonard is a 24 y.o.  at 25w1d here for her routine OB visit.   She is taking her prenatal vitamins.Reports no loss of fluid or vaginal bleeding. Patient doing well without any complaints except for episodes of fast heart rate.  I discussed with patient that these episodes happen frequently then we may have to consider a cardiology consult.. Pregnancy is complicated by:     Patient Active Problem List   Diagnosis    Lymph node enlargement    Generalized abdominal pain    Gastroesophageal reflux disease without esophagitis    Other dysphagia    Upper abdominal pain    Screening for viral disease    Blood in stool    Rectal bleeding    Dyspepsia    Dysphagia    Globus sensation    Pain of upper abdomen    FH: colon cancer    Low-lying placenta         OB History    Para Term  AB Living   4 2 2   1 2   SAB IAB Ectopic Molar Multiple Live Births   1         2      # Outcome Date GA Lbr Chase/2nd Weight Sex Type Anes PTL Lv   4 Current            3 SAB 22 4w0d   U    FD   2 Term 20 38w0d  2722 g (6 lb) F Vag-Spont EPI N THU   1 Term 18 37w0d  2495 g (5 lb 8 oz) F Vag-Spont EPI N THU           ROS:  General ROS: negative for - chills or fatigue  Genito-Urinary ROS: negative for  change in urinary stream, vaginal discharge     Objective  Physical Exam:   Vitals:    24 1409   BP: 105/76       Uterine Size: size equals dates  FHT: 110-160 BPM    General appearance - alert, well appearing, and in no distress  Abdomen- Soft, Gravid uterus, non-tender to palpation  Extremeties: negative swelling       Assessment/Plan:   Assessment & Plan  Normal pregnancy in second trimester    Orders:    POC Urinalysis Dipstick    Low-lying placenta    Orders:    US OB Limited 1 + Fetuses; Future      Counseling:     Round Ligament Pain:  The uterus has several ligaments which provide support and keep the uterus in place. As the  uterus grows  these ligaments are pulled and stretched which often causes sharp stabbing like pain in the inguinal area.   You may find a pregnancy support band helpful. Changing positions may also help. Yoga is a great way to cope with round ligament and low back pain in pregnancy.    Massage may also help with low back pain   Things to Consider at this Point in your Pregnancy:  Some women experience swelling in their feet during pregnancy. Compression stockings may help  Drink plenty of water and stay active   Make sure you are eating frequent small meals, nuts are a wonderful snack to keep with you            No follow-ups on file.      We have gone over prenatal care to include the timing and content of visits. I informed her how to contact the office and/or on call person in the event of any problems and encouraged her to do so when she feels it is necessary.  We then spent time answering her questions which she indicated were answered to her satisfaction.    Cale Del Rio MD  12/4/2024 14:29 EST

## 2024-12-27 ENCOUNTER — ROUTINE PRENATAL (OUTPATIENT)
Age: 24
End: 2024-12-27
Payer: COMMERCIAL

## 2024-12-27 VITALS — SYSTOLIC BLOOD PRESSURE: 102 MMHG | WEIGHT: 116.2 LBS | DIASTOLIC BLOOD PRESSURE: 54 MMHG | BODY MASS INDEX: 19.95 KG/M2

## 2024-12-27 DIAGNOSIS — Z36.89 ULTRASOUND SCAN TO EVALUATE PLACENTA LOCATION: ICD-10-CM

## 2024-12-27 DIAGNOSIS — O44.40 LOW-LYING PLACENTA: ICD-10-CM

## 2024-12-27 DIAGNOSIS — Z3A.28 28 WEEKS GESTATION OF PREGNANCY: Primary | ICD-10-CM

## 2024-12-27 DIAGNOSIS — Z13.1 SCREENING FOR DIABETES MELLITUS: ICD-10-CM

## 2024-12-27 LAB
DEPRECATED RDW RBC AUTO: 37.9 FL (ref 37–54)
ERYTHROCYTE [DISTWIDTH] IN BLOOD BY AUTOMATED COUNT: 11.9 % (ref 12.3–15.4)
GLUCOSE UR STRIP-MCNC: NEGATIVE MG/DL
HCT VFR BLD AUTO: 34.8 % (ref 34–46.6)
HGB BLD-MCNC: 11.6 G/DL (ref 12–15.9)
MCH RBC QN AUTO: 29.6 PG (ref 26.6–33)
MCHC RBC AUTO-ENTMCNC: 33.3 G/DL (ref 31.5–35.7)
MCV RBC AUTO: 88.8 FL (ref 79–97)
PLATELET # BLD AUTO: 214 10*3/MM3 (ref 140–450)
PMV BLD AUTO: 11.5 FL (ref 6–12)
PROT UR STRIP-MCNC: NEGATIVE MG/DL
RBC # BLD AUTO: 3.92 10*6/MM3 (ref 3.77–5.28)
WBC NRBC COR # BLD AUTO: 7.52 10*3/MM3 (ref 3.4–10.8)

## 2024-12-27 PROCEDURE — 86592 SYPHILIS TEST NON-TREP QUAL: CPT

## 2024-12-27 PROCEDURE — 82950 GLUCOSE TEST: CPT

## 2024-12-27 PROCEDURE — 85027 COMPLETE CBC AUTOMATED: CPT

## 2024-12-27 NOTE — PROGRESS NOTES
Chief Complaint   Patient presents with    Routine Prenatal Visit     OB Check, Pt is 28w3d, Placenta Check U/S today, 1 HR GTT today, Pt declines Tdap vaccine today, Breast pump order faxed today for patient, Pt has no complaints today       HPI: 24 y.o.  at 28w3d doing well  Good fm  No lof or vaginal bleeding  Occasional cyndie vinson that resolve    Relevant data reviewed: US Ob Detail Fetal Anatomy Single or First Gestation (2024 10:19)  US OB Limited 1 + Fetuses (2024 13:28)    Last OB US Data (since 6/10/2024)       None          Vitals:    24 1322   BP: 102/54   Weight: 52.7 kg (116 lb 3.2 oz)     Total weight gain for pregnancy:  7.348 kg (16 lb 3.2 oz)    Cx exam:   / /   Presentation: Vertex    Review of systems:   Gen: negative  CV:     negative  GI: negative  :   negative and good fetal movement noted   MS:    negative  Neuro: negative  Pul: negative    Physical Exam  Constitutional:       General: She is not in acute distress.  Pulmonary:      Effort: Pulmonary effort is normal.   Abdominal:      Palpations: Abdomen is soft.      Tenderness: There is no abdominal tenderness.   Skin:     General: Skin is warm.   Neurological:      Mental Status: She is alert.   Psychiatric:         Mood and Affect: Mood normal.         Thought Content: Thought content normal.         Judgment: Judgment normal.         A/P  1. Intrauterine pregnancy at 28w3d        Diagnoses and all orders for this visit:    1. 28 weeks gestation of pregnancy (Primary)  -     CBC (No Diff)  -     POC Urinalysis Dipstick  -     RPR, Rfx Qn RPR / Confirm TP ([facility])    2. Ultrasound scan to evaluate placenta location    3. Screening for diabetes mellitus  -     Gestational Screen 1 Hr ([facility])  -     CBC (No Diff)  -     RPR, Rfx Qn RPR / Confirm TP ([facility])    4. Low-lying placenta         labor was discussed.  Warnings were provided.  Nutrition and weight gain were  addressed.  -----------------------  PLAN: discussed US low lying placenta has improved slightly from 1.14 to 1.75 cm  Plan to repeat at 32 weeks  Avoid IC and lifting heavy items > 20 lbs  Any vb go to l/d  1hr gtt today  Declined tdap  No follow-ups on file.    Kira Gonzales, APRN  12/27/2024 13:44 EST

## 2024-12-28 LAB
GLUCOSE 1H P 50 G GLC PO SERPL-MCNC: 145 MG/DL (ref 70–139)
RPR SER QL: NON REACTIVE

## 2024-12-31 ENCOUNTER — TELEPHONE (OUTPATIENT)
Age: 24
End: 2024-12-31

## 2024-12-31 ENCOUNTER — CLINICAL SUPPORT (OUTPATIENT)
Age: 24
End: 2024-12-31
Payer: COMMERCIAL

## 2024-12-31 DIAGNOSIS — R39.9 UTI SYMPTOMS: Primary | ICD-10-CM

## 2024-12-31 DIAGNOSIS — R30.0 DYSURIA: ICD-10-CM

## 2024-12-31 DIAGNOSIS — R35.0 URINARY FREQUENCY: Primary | ICD-10-CM

## 2024-12-31 LAB
BILIRUB BLD-MCNC: NEGATIVE MG/DL
CLARITY, POC: CLEAR
COLOR UR: YELLOW
GLUCOSE UR STRIP-MCNC: NEGATIVE MG/DL
KETONES UR QL: NEGATIVE
LEUKOCYTE EST, POC: NEGATIVE
NITRITE UR-MCNC: NEGATIVE MG/ML
PH UR: 6 [PH] (ref 5–8)
PROT UR STRIP-MCNC: NEGATIVE MG/DL
RBC # UR STRIP: NEGATIVE /UL
SP GR UR: 1 (ref 1–1.03)
UROBILINOGEN UR QL: NORMAL

## 2024-12-31 PROCEDURE — 87086 URINE CULTURE/COLONY COUNT: CPT

## 2024-12-31 RX ORDER — NITROFURANTOIN 25; 75 MG/1; MG/1
100 CAPSULE ORAL 2 TIMES DAILY
Qty: 10 CAPSULE | Refills: 0 | Status: SHIPPED | OUTPATIENT
Start: 2024-12-31 | End: 2025-01-02

## 2024-12-31 NOTE — TELEPHONE ENCOUNTER
Caller: Taya Leonard    Relationship: Self    Best call back number:[phone]      Who are you requesting to speak with (clinical staff, DR. MCDONOUGH  OR MEY    What was the call regarding: PATIENT WAS RETURNING A CALL TO MEY.

## 2024-12-31 NOTE — TELEPHONE ENCOUNTER
Ob patient calling stating she is having pelvic pressure, cramping and burning/incontinence while urinating. Pt stating she has had these symptoms since Friday and started having lower back pain on Sunday. Pt reports she was around family during kishan who has the Flu. Please advise

## 2025-01-01 LAB — BACTERIA SPEC AEROBE CULT: NO GROWTH

## 2025-01-07 ENCOUNTER — TELEPHONE (OUTPATIENT)
Age: 25
End: 2025-01-07
Payer: COMMERCIAL

## 2025-01-07 DIAGNOSIS — Z13.1 SPECIAL SCREENING EXAMINATION FOR DIABETES MELLITUS: Primary | ICD-10-CM

## 2025-01-07 NOTE — TELEPHONE ENCOUNTER
Pt had to cancel 3 HR GTT today, Called  Lab and rescheduled patient for 01/14/2025 @ 9:00 Am. Pt notified and agrees this time/date works for her.

## 2025-01-09 ENCOUNTER — TELEPHONE (OUTPATIENT)
Age: 25
End: 2025-01-09
Payer: COMMERCIAL

## 2025-01-09 RX ORDER — FERROUS SULFATE 325(65) MG
325 TABLET ORAL
Qty: 30 TABLET | Refills: 1 | Status: SHIPPED | OUTPATIENT
Start: 2025-01-09

## 2025-01-09 NOTE — TELEPHONE ENCOUNTER
Caller: Taya Leonard    Relationship: Self    Best call back number: [phone] CALL ANYTIME.    Caller requesting test results: PATIENT    What test was performed: LABS     When was the test performed: 12/27/24    Where was the test performed: IN OFFICE    Additional notes: OB PATIENT IS REQUESTING TO SPEAK WITH CLINICAL TO DISCUSS TEST RESULTS. PATIENT IS CONCERNED IF HAVING SYMPTOMS OF LOW IRON.    CRAVING ICE, SMELL OF SOAP AND CLEANING SUPPLIES. HAS URGE TO TASTE SOAP AND CLEANING SUPPLIES. OCCASIONAL FASTER HEART RATE.

## 2025-01-09 NOTE — TELEPHONE ENCOUNTER
Spoke to patient she voiced understanding MICHAEL Amador is sending a prescription for iron to her RX. Follow instructions, be aware it can cause constipation and or dark stool. She has appt.  Wednesday w/Dr. Del Rio.

## 2025-01-14 ENCOUNTER — LAB (OUTPATIENT)
Facility: HOSPITAL | Age: 25
End: 2025-01-14
Payer: COMMERCIAL

## 2025-01-14 DIAGNOSIS — Z13.1 SPECIAL SCREENING EXAMINATION FOR DIABETES MELLITUS: ICD-10-CM

## 2025-01-14 LAB
GLUCOSE BLDC GLUCOMTR-MCNC: 67 MG/DL (ref 70–99)
GLUCOSE P FAST SERPL-MCNC: 76 MG/DL (ref 65–94)
GTT GEST 2H PNL UR+SERPL: 172 MG/DL (ref 65–179)
GTT GEST 3H PNL SERPL: 173 MG/DL (ref 65–139)
GTT GEST 3H PNL SERPL: 178 MG/DL (ref 65–154)

## 2025-01-14 PROCEDURE — 82952 GTT-ADDED SAMPLES: CPT

## 2025-01-14 PROCEDURE — 82951 GLUCOSE TOLERANCE TEST (GTT): CPT

## 2025-01-14 PROCEDURE — 36415 COLL VENOUS BLD VENIPUNCTURE: CPT

## 2025-01-15 ENCOUNTER — ROUTINE PRENATAL (OUTPATIENT)
Age: 25
End: 2025-01-15
Payer: COMMERCIAL

## 2025-01-15 VITALS — WEIGHT: 118.3 LBS | SYSTOLIC BLOOD PRESSURE: 124 MMHG | BODY MASS INDEX: 20.31 KG/M2 | DIASTOLIC BLOOD PRESSURE: 86 MMHG

## 2025-01-15 DIAGNOSIS — O36.5931 POOR FETAL GROWTH AFFECTING MANAGEMENT OF MOTHER IN THIRD TRIMESTER, FETUS 1 OF MULTIPLE GESTATION: ICD-10-CM

## 2025-01-15 DIAGNOSIS — O44.40 LOW-LYING PLACENTA: Primary | ICD-10-CM

## 2025-01-15 DIAGNOSIS — R73.09 ABNORMAL GLUCOSE TOLERANCE TEST: ICD-10-CM

## 2025-01-15 DIAGNOSIS — Z3A.31 31 WEEKS GESTATION OF PREGNANCY: ICD-10-CM

## 2025-01-15 PROBLEM — O36.5930 POOR FETAL GROWTH AFFECTING MANAGEMENT OF MOTHER IN THIRD TRIMESTER: Status: ACTIVE | Noted: 2025-01-15

## 2025-01-16 NOTE — PROGRESS NOTES
Routine Prenatal Visit     Subjective  Taya Leonard is a 24 y.o.  at 31w2d here for her routine OB visit.   She is taking her prenatal vitamins.Reports no loss of fluid or vaginal bleeding. Patient doing well without any complaints.  I discussed with patient her abnormal 3-hour glucose tolerance test.  Will put in a referral for nutritional counseling pregnancy complicated by:     Patient Active Problem List   Diagnosis    Lymph node enlargement    Generalized abdominal pain    Gastroesophageal reflux disease without esophagitis    Other dysphagia    Upper abdominal pain    Screening for viral disease    Blood in stool    Rectal bleeding    Dyspepsia    Dysphagia    Globus sensation    Pain of upper abdomen    FH: colon cancer    Low-lying placenta    Poor fetal growth affecting management of mother in third trimester    Abnormal glucose tolerance test         OB History    Para Term  AB Living   4 2 2   1 2   SAB IAB Ectopic Molar Multiple Live Births   1         2      # Outcome Date GA Lbr Chase/2nd Weight Sex Type Anes PTL Lv   4 Current            3 SAB 22 4w0d   U    FD   2 Term 20 38w0d  2722 g (6 lb) F Vag-Spont EPI N THU   1 Term 18 37w0d  2495 g (5 lb 8 oz) F Vag-Spont EPI N THU           ROS:  General ROS: negative for - chills or fatigue  Genito-Urinary ROS: negative for  change in urinary stream, vaginal discharge     Objective  Physical Exam:   Vitals:    01/15/25 1326   BP: 124/86       Uterine Size: size less than dates  FHT: 110-160 BPM    General appearance - alert, well appearing, and in no distress  Abdomen- Soft, Gravid uterus, non-tender to palpation  Extremeties: negative swelling       Assessment/Plan:   Assessment & Plan  Low-lying placenta         Poor fetal growth affecting management of mother in third trimester, fetus 1 of multiple gestation    Orders:    US Ob Follow Up Transabdominal Approach; Future    Abnormal glucose tolerance  test    Orders:    Ambulatory Referral to Diabetic Education    31 weeks gestation of pregnancy           Counseling:     Round Ligament Pain:  The uterus has several ligaments which provide support and keep the uterus in place. As the  uterus grows these ligaments are pulled and stretched which often causes sharp stabbing like pain in the inguinal area.   You may find a pregnancy support band helpful. Changing positions may also help. Yoga is a great way to cope with round ligament and low back pain in pregnancy.    Massage may also help with low back pain   Things to Consider at this Point in your Pregnancy:  Some women experience swelling in their feet during pregnancy. Compression stockings may help  Drink plenty of water and stay active   Make sure you are eating frequent small meals, nuts are a wonderful snack to keep with you            Return in about 2 weeks (around 1/29/2025).      We have gone over prenatal care to include the timing and content of visits. I informed her how to contact the office and/or on call person in the event of any problems and encouraged her to do so when she feels it is necessary.  We then spent time answering her questions which she indicated were answered to her satisfaction.    Cale Del Rio MD  1/16/2025 10:10 EST

## 2025-01-20 ENCOUNTER — TELEPHONE (OUTPATIENT)
Age: 25
End: 2025-01-20

## 2025-01-20 ENCOUNTER — EDUCATION (OUTPATIENT)
Dept: DIABETES SERVICES | Facility: HOSPITAL | Age: 25
End: 2025-01-20
Payer: COMMERCIAL

## 2025-01-20 NOTE — TELEPHONE ENCOUNTER
Provider: DR MCDONOUGH    Caller: Taya Leonard    Relationship to Patient: Self    Pharmacy: [facility]    Phone Number: [phone] / LVM    Reason for Call: OB PT WAS SEEN TODAY BY DR RODRIGUEZ (DIABETIC DR) AND TOLD THE PT TO CALL DR MCDONOUGH'S OFFICE AND HAVE HIM ORDER THE GLUCOSE MONITOR SET - STRIPS AND ALL    PLEASE CALL THE PT CONFIRM    THANK YOU!

## 2025-01-20 NOTE — PROGRESS NOTES
Taya Leonard 24 y.o. was referred from Cale Del Rio MD. Patient is a  .    Ht: 64 inches    Wt: 118 pounds 4 ounces    Allergies   Allergen Reactions    Amoxicillin Rash        Date: 2024 1 Hour Glucola: 145.  Date: 2025 3 Hour Glucola:Fasting 76, 1hr: 172, 2hr 178, 3hr 173.    Taya Leonard was instructed regarding causes and effects of gestational diabetes. She was instructed regarding the possible effects gestational diabetes may have for her and her infant.     Patient was also instructed in a balanced 2000 calorie diet. (3 medium meals and 3 snacks a day) 42-47 carbs per meal. Nutrition in the Fast Stan given to patient. Explained to patient when eating out to make healthy choices and taking part of the meal home in a to go box. Explained protein was needed with every meal.    I also advised the patient regarding blood glucose monitoring. She needs to test her blood sugar fasting and 2 hours after each meal. Blood glucose log was given to patient. This information should be given to her provider at each visit.     The importance of exercise, medications, prenatal care, postpartum care were discussed and she was encouraged to breast feed. Patient was encouraged to have follow up blood work after pregnancy. Written materials given to patient. DSME program telephone number given to patient. Patient encouraged to call with any questions, comments, or concerns.     Time started: 1: 00    Time ended: 1: 40    Valeria Aguayo, RNC-AWHC, MLDE, [facility]  2025

## 2025-01-21 DIAGNOSIS — R73.09 ABNORMAL GLUCOSE TOLERANCE TEST: Primary | ICD-10-CM

## 2025-01-21 RX ORDER — BLOOD-GLUCOSE METER
KIT MISCELLANEOUS
Qty: 1 EACH | Refills: 0 | Status: SHIPPED | OUTPATIENT
Start: 2025-01-21

## 2025-01-21 RX ORDER — GLUCOSAMINE HCL/CHONDROITIN SU 500-400 MG
1 CAPSULE ORAL 4 TIMES DAILY
Qty: 200 EACH | Refills: 11 | Status: SHIPPED | OUTPATIENT
Start: 2025-01-21

## 2025-01-29 ENCOUNTER — TELEPHONE (OUTPATIENT)
Dept: OBSTETRICS AND GYNECOLOGY | Facility: CLINIC | Age: 25
End: 2025-01-29
Payer: COMMERCIAL

## 2025-01-29 ENCOUNTER — ROUTINE PRENATAL (OUTPATIENT)
Age: 25
End: 2025-01-29
Payer: COMMERCIAL

## 2025-01-29 VITALS — DIASTOLIC BLOOD PRESSURE: 76 MMHG | WEIGHT: 122.1 LBS | SYSTOLIC BLOOD PRESSURE: 118 MMHG | BODY MASS INDEX: 20.96 KG/M2

## 2025-01-29 DIAGNOSIS — O24.410 DIET CONTROLLED GESTATIONAL DIABETES MELLITUS (GDM) IN THIRD TRIMESTER: ICD-10-CM

## 2025-01-29 DIAGNOSIS — O36.5931 POOR FETAL GROWTH AFFECTING MANAGEMENT OF MOTHER IN THIRD TRIMESTER, FETUS 1 OF MULTIPLE GESTATION: Primary | ICD-10-CM

## 2025-01-29 DIAGNOSIS — Z3A.33 33 WEEKS GESTATION OF PREGNANCY: Primary | ICD-10-CM

## 2025-01-29 DIAGNOSIS — F50.89 PICA: ICD-10-CM

## 2025-01-29 DIAGNOSIS — R73.09 ABNORMAL GLUCOSE TOLERANCE TEST: ICD-10-CM

## 2025-01-29 PROBLEM — O44.40 LOW-LYING PLACENTA: Status: RESOLVED | Noted: 2024-12-04 | Resolved: 2025-01-29

## 2025-01-29 LAB
DEPRECATED RDW RBC AUTO: 40.2 FL (ref 37–54)
ERYTHROCYTE [DISTWIDTH] IN BLOOD BY AUTOMATED COUNT: 13 % (ref 12.3–15.4)
FERRITIN SERPL-MCNC: 11.7 NG/ML (ref 13–150)
GLUCOSE UR STRIP-MCNC: NEGATIVE MG/DL
HCT VFR BLD AUTO: 35.1 % (ref 34–46.6)
HGB BLD-MCNC: 11.4 G/DL (ref 12–15.9)
IRON 24H UR-MRATE: 37 MCG/DL (ref 37–145)
IRON SATN MFR SERPL: 4 % (ref 20–50)
MCH RBC QN AUTO: 27.7 PG (ref 26.6–33)
MCHC RBC AUTO-ENTMCNC: 32.5 G/DL (ref 31.5–35.7)
MCV RBC AUTO: 85.4 FL (ref 79–97)
PLATELET # BLD AUTO: 261 10*3/MM3 (ref 140–450)
PMV BLD AUTO: 11.3 FL (ref 6–12)
PROT UR STRIP-MCNC: NEGATIVE MG/DL
RBC # BLD AUTO: 4.11 10*6/MM3 (ref 3.77–5.28)
TIBC SERPL-MCNC: 890 MCG/DL (ref 298–536)
TRANSFERRIN SERPL-MCNC: 597 MG/DL (ref 200–360)
WBC NRBC COR # BLD AUTO: 6.96 10*3/MM3 (ref 3.4–10.8)

## 2025-01-29 PROCEDURE — 76816 OB US FOLLOW-UP PER FETUS: CPT | Performed by: OBSTETRICS & GYNECOLOGY

## 2025-01-29 PROCEDURE — 82728 ASSAY OF FERRITIN: CPT

## 2025-01-29 PROCEDURE — 83540 ASSAY OF IRON: CPT

## 2025-01-29 PROCEDURE — 85027 COMPLETE CBC AUTOMATED: CPT

## 2025-01-29 PROCEDURE — 84466 ASSAY OF TRANSFERRIN: CPT

## 2025-01-29 NOTE — PROGRESS NOTES
Chief Complaint   Patient presents with    Routine Prenatal Visit     OB Check, Pt is 33w1d, Growth U/S today, Pt declines Tdap vaccine & RSV vaccine today, Pt stating she is still currently having cravings for ice, Pt reports she has been taking oral iron. Pt also has been taking Blood sugar but forgot her log today        HPI: 24 y.o.  at 33w1d she has been taking her iron supplements  Reports missing occasionally  She has had an increase craving for ice and soap  Did not bring BS log thinks her numbers have been in the 70/80's  Denies any vb, lof or contractions  Good FM  She saw the diabetic educator  Low lying placenta resolved      Relevant data reviewed: US Ob Follow Up Transabdominal Approach (2025 09:32)     Gestational, Glucose Tolerance, 3 Hour (2025 09:49)     Gestational Screen 1 Hr ([facility]) (2024 14:07)     Last OB US Data (since 2024)         Value Time User    EFW%ILE  12.7%ile 2025 10:07 AM Kira Gonzales APRN    AC%ILE  18.6%ile 2025 10:07 AM Kira Gonzales APRN          Vitals:    25 1002   BP: 118/76   Weight: 55.4 kg (122 lb 1.6 oz)     Total weight gain for pregnancy:  10 kg (22 lb 1.6 oz)    Cx exam:   / /   Presentation: Vertex    Review of systems:   Gen: negative  CV:     negative  GI: negative  :   good fetal movement noted   MS:    negative  Neuro: negative and denies headaches and visual changes   Pul: negative    Physical Exam  Constitutional:       General: She is not in acute distress.  Pulmonary:      Effort: Pulmonary effort is normal.   Abdominal:      Palpations: Abdomen is soft.      Tenderness: There is no abdominal tenderness.   Skin:     General: Skin is warm.   Neurological:      Mental Status: She is alert.   Psychiatric:         Mood and Affect: Mood normal.         Thought Content: Thought content normal.         Judgment: Judgment normal.         A/P  1. Intrauterine pregnancy at 33w1d   2. Pregnancy Risk:   COMPLICATED     Diagnoses and all orders for this visit:    1. 33 weeks gestation of pregnancy (Primary)  -     POC Urinalysis Dipstick    2. Diet controlled gestational diabetes mellitus (GDM) in third trimester  -     Ambulatory Referral to [facility]/Perinatology    3. Pica  -     CBC (No Diff)  -     Ferritin  -     Iron Profile    4. Abnormal glucose tolerance test  -     Ambulatory Referral to [facility]/Perinatology         labor was discussed.  Warnings were provided.  Nutrition and weight gain were addressed.  -----------------------  PLAN: will check some labs - pica  PTB precautions  Referral to [facility] for GDM  Bs log to email our office  New packet given  Discussed healthy diet and importance of keeping BS log  Return in about 2 weeks (around 2025).    Kira Gonzales, APRN  2025 10:34 EST

## 2025-01-29 NOTE — TELEPHONE ENCOUNTER
Call placed to Taya to review [facility] diabetes management. Pt reports she is currently checking her blood sugars 4 times a day: fasting and 2 hours after eating. Discussed with pt MFM management and checking blood sugars 7 times a day: before meals, one hour after meals and before bedtime snack. Pt agreeable to this adjustment. Discussed carb goals and eating every 2-3 hours with 30g carbs for breakfast, 45g carbs for lunch and dinner and 10-15g carbs with snacks in between meals and at bedtime. Pt verbalized understanding. Glucose goals of 60-90 before meals and  one hour after meals were reviewed. Discussed with patient that if glucose values are consistently outside of the targeted range, MFM will discuss starting her on medications. Pt not on medications for blood sugars at this time. Encouraged pt to start checking blood sugars 7 times a day and bring glucose logs to her upcoming appointment. Taya verbalized understanding.   [employer] message sent to patient with MFM glucose log attached. Will provide patient with M folder with diabetes packet, hypoglycemia protocol and extra glucose logs at time of appointment. Pt reports she has already met with Diabetic Education at this time. All questions answered at time of call and encouraged patient to call this RN with any additional questions prior to her appointment. [facility] looks forward to seeing pt on 2/11.

## 2025-01-29 NOTE — TELEPHONE ENCOUNTER
Referral received from OB for GDM. Attempt to call patient to discuss diabetes management with MFM office and schedule her for appointment with MFM. No answer. Voicemail left stating reason for call and call back number.

## 2025-01-30 NOTE — PROGRESS NOTES
Pt notified of results     Pt reports she started taking her iron on 01/13/2024 pt was only taking this Mon,Wed,Friday, after she saw  he advised her to start taking it daily on 01/16/2025. Pt reports since she has been taking this daily but states she may have missed a couple days. I advised patient to continue this daily and would inform Kira and will call her back with any additional advise. Pt agrees with understanding

## 2025-02-03 ENCOUNTER — TRANSCRIBE ORDERS (OUTPATIENT)
Dept: ULTRASOUND IMAGING | Facility: HOSPITAL | Age: 25
End: 2025-02-03
Payer: COMMERCIAL

## 2025-02-03 DIAGNOSIS — O24.419 GESTATIONAL DIABETES MELLITUS (GDM), ANTEPARTUM, GESTATIONAL DIABETES METHOD OF CONTROL UNSPECIFIED: Primary | ICD-10-CM

## 2025-02-04 ENCOUNTER — DOCUMENTATION (OUTPATIENT)
Dept: OBSTETRICS AND GYNECOLOGY | Facility: CLINIC | Age: 25
End: 2025-02-04
Payer: COMMERCIAL

## 2025-02-04 NOTE — PROGRESS NOTES
Weekly MFM glucose log evaluation. No adjustments were made this week. Pt continues with diet she is on and no medications at this time.     MFM will review glucose logs again on  2/11.

## 2025-02-10 DIAGNOSIS — R73.09 ABNORMAL GLUCOSE TOLERANCE TEST: ICD-10-CM

## 2025-02-10 RX ORDER — GLUCOSAMINE HCL/CHONDROITIN SU 500-400 MG
1 CAPSULE ORAL 4 TIMES DAILY
Qty: 200 EACH | Refills: 11 | Status: SHIPPED | OUTPATIENT
Start: 2025-02-10 | End: 2025-02-11 | Stop reason: SDUPTHER

## 2025-02-10 RX ORDER — FERROUS SULFATE 325(65) MG
325 TABLET ORAL
Qty: 30 TABLET | Refills: 1 | Status: SHIPPED | OUTPATIENT
Start: 2025-02-10

## 2025-02-11 DIAGNOSIS — R73.09 ABNORMAL GLUCOSE TOLERANCE TEST: ICD-10-CM

## 2025-02-11 RX ORDER — GLUCOSAMINE HCL/CHONDROITIN SU 500-400 MG
CAPSULE ORAL
Qty: 200 EACH | Refills: 11 | Status: SHIPPED | OUTPATIENT
Start: 2025-02-11

## 2025-02-11 RX ORDER — ALCOHOL ANTISEPTIC PADS
PADS, MEDICATED (EA) TOPICAL
Qty: 200 EACH | Refills: 10 | Status: SHIPPED | OUTPATIENT
Start: 2025-02-11

## 2025-02-12 ENCOUNTER — HOSPITAL ENCOUNTER (OUTPATIENT)
Dept: ULTRASOUND IMAGING | Facility: HOSPITAL | Age: 25
Discharge: HOME | End: 2025-02-12
Admitting: STUDENT IN AN ORGANIZED HEALTH CARE EDUCATION/TRAINING PROGRAM
Payer: COMMERCIAL

## 2025-02-12 ENCOUNTER — ROUTINE PRENATAL (OUTPATIENT)
Age: 25
End: 2025-02-12
Payer: COMMERCIAL

## 2025-02-12 ENCOUNTER — OFFICE VISIT (OUTPATIENT)
Dept: OBSTETRICS AND GYNECOLOGY | Facility: CLINIC | Age: 25
End: 2025-02-12
Payer: COMMERCIAL

## 2025-02-12 VITALS — WEIGHT: 127.5 LBS | BODY MASS INDEX: 21.89 KG/M2 | SYSTOLIC BLOOD PRESSURE: 118 MMHG | DIASTOLIC BLOOD PRESSURE: 88 MMHG

## 2025-02-12 VITALS
WEIGHT: 127.8 LBS | HEART RATE: 100 BPM | TEMPERATURE: 98.4 F | BODY MASS INDEX: 21.94 KG/M2 | DIASTOLIC BLOOD PRESSURE: 78 MMHG | SYSTOLIC BLOOD PRESSURE: 118 MMHG

## 2025-02-12 DIAGNOSIS — O24.410 DIET CONTROLLED GESTATIONAL DIABETES MELLITUS (GDM) IN THIRD TRIMESTER: Primary | ICD-10-CM

## 2025-02-12 DIAGNOSIS — N89.8 VAGINAL DISCHARGE: ICD-10-CM

## 2025-02-12 DIAGNOSIS — O24.419 GESTATIONAL DIABETES MELLITUS (GDM), ANTEPARTUM, GESTATIONAL DIABETES METHOD OF CONTROL UNSPECIFIED: ICD-10-CM

## 2025-02-12 DIAGNOSIS — Z36.85 ANTENATAL SCREENING FOR STREPTOCOCCUS B: ICD-10-CM

## 2025-02-12 DIAGNOSIS — O24.410 DIET CONTROLLED GESTATIONAL DIABETES MELLITUS (GDM), ANTEPARTUM: ICD-10-CM

## 2025-02-12 DIAGNOSIS — Z3A.35 35 WEEKS GESTATION OF PREGNANCY: Primary | ICD-10-CM

## 2025-02-12 DIAGNOSIS — Z3A.35 35 WEEKS GESTATION OF PREGNANCY: ICD-10-CM

## 2025-02-12 DIAGNOSIS — O47.9 BRAXTON HICK'S CONTRACTION: ICD-10-CM

## 2025-02-12 LAB
GLUCOSE UR STRIP-MCNC: NEGATIVE MG/DL
PROT UR STRIP-MCNC: NEGATIVE MG/DL

## 2025-02-12 PROCEDURE — 87653 STREP B DNA AMP PROBE: CPT

## 2025-02-12 PROCEDURE — 87591 N.GONORRHOEAE DNA AMP PROB: CPT

## 2025-02-12 PROCEDURE — 87491 CHLMYD TRACH DNA AMP PROBE: CPT

## 2025-02-12 PROCEDURE — 87801 DETECT AGNT MULT DNA AMPLI: CPT

## 2025-02-12 PROCEDURE — 82948 REAGENT STRIP/BLOOD GLUCOSE: CPT | Performed by: STUDENT IN AN ORGANIZED HEALTH CARE EDUCATION/TRAINING PROGRAM

## 2025-02-12 PROCEDURE — 87661 TRICHOMONAS VAGINALIS AMPLIF: CPT

## 2025-02-12 PROCEDURE — 87798 DETECT AGENT NOS DNA AMP: CPT

## 2025-02-12 PROCEDURE — 76819 FETAL BIOPHYS PROFIL W/O NST: CPT

## 2025-02-12 PROCEDURE — 76811 OB US DETAILED SNGL FETUS: CPT

## 2025-02-12 RX ORDER — BLOOD-GLUCOSE METER
EACH MISCELLANEOUS
COMMUNITY
Start: 2025-01-21

## 2025-02-12 NOTE — PROGRESS NOTES
MATERNAL FETAL MEDICINE Consult Note    Dear Dr Kira Gonzales, AP*:    Thank you for your kind referral of Taya Leonard.  As you know, she is a 24 y.o.   35w1d gestation (Estimated Date of Delivery: 3/18/25). This is a consult.      Her antepartum course is complicated by:  Diet-controlled gestational diabetes      HPI: No contractions, LOF, vaginal bleeding. Normal fetal movement.   She denies headache, vision changes, shortness of breath, acute changes in edema, and RUQ pain.       Review of History:  Past Medical History:   Diagnosis Date    Allergy to gluten     Gastritis     GDM (gestational diabetes mellitus)     GERD (gastroesophageal reflux disease) 2023    Raynaud's phenomenon      Past Surgical History:   Procedure Laterality Date    COLONOSCOPY  2024    COLONOSCOPY N/A 3/26/2024    Procedure: COLONOSCOPY;  Surgeon: Darwin Cid MD;  Location: [facility];  Service: Gastroenterology;  Laterality: N/A;  NORMAL COLONOSCOPY    ENDOSCOPY  2024    ENDOSCOPY N/A 3/26/2024    Procedure: ESOPHAGOGASTRODUODENOSCOPY WITH BIOPSIES;  Surgeon: Darwin Cid MD;  Location: [facility] ENDOSCOPY;  Service: Gastroenterology;  Laterality: N/A;  NORMAL ENDOSCOPY       OB Hx:    Social History     Socioeconomic History    Marital status:     Number of children: 2   Tobacco Use    Smoking status: Never     Passive exposure: Never    Smokeless tobacco: Never   Vaping Use    Vaping status: Never Used   Substance and Sexual Activity    Alcohol use: Never    Drug use: Never    Sexual activity: Yes     Partners: Male     Birth control/protection: Condom     Family History   Problem Relation Age of Onset    Prostate cancer Paternal Grandfather     Colon cancer Maternal Grandfather     Breast cancer Neg Hx     Ovarian cancer Neg Hx     Uterine cancer Neg Hx       Allergies   Allergen Reactions    Amoxicillin Rash      Current Outpatient Medications on File Prior to  Visit   Medication Sig Dispense Refill    ferrous sulfate 325 (65 FE) MG tablet Take 1 tablet by mouth Daily With Breakfast. 30 tablet 1    Glucose Blood (Blood Glucose Test) strip Use to check blood sugar 7 times a day. Please dispense test strips compatible with the One Touch Ultra 2 Glucometer. 200 each 11    glucose monitor monitoring kit Use four times daily to check blood glucose. 1 each 0    Lancets 30G misc Use to check blood sugar 7 times a day. Please dispense lancets compatible with the One Touch Ultra 2 Glucometer System. 200 each 10    prenatal vitamin (prenatal, CLASSIC, vitamin) tablet Take 1 tablet by mouth Daily.      omeprazole (priLOSEC) 20 MG capsule Take 1 capsule by mouth Daily. (Patient not taking: Reported on 1/29/2025) 30 capsule 5    ondansetron ODT (ZOFRAN-ODT) 4 MG disintegrating tablet Place 1 tablet on the tongue Every 8 (Eight) Hours As Needed for Nausea or Vomiting. (Patient not taking: Reported on 2/12/2025) 10 tablet 0     No current facility-administered medications on file prior to visit.        Past obstetric, gynecological, medical, surgical, family and social history reviewed.  Relevant lab work and imaging reviewed.    Review of systems  As above in HPI     Vitals:    02/12/25 1122   BP: 118/78   BP Location: Right arm   Patient Position: Sitting   Pulse: 100   Temp: 98.4 °F (36.9 °C)   TempSrc: Temporal   Weight: 58 kg (127 lb 12.8 oz)       PHYSICAL EXAM   General: No acute distress  Respiratory: No increased work of breathing  Cardiac: Mild tachycardia  appropriate for pregnancy  Abdominal: Gravid, nontender  Extremities: No significant edema  Neuro/psych: Alert and oriented, appropriate mood      ULTRASOUND   Please view full ultrasound note on Imaging tab in ViewPoint.  Cephalic Presentation  Posterior placenta  Fetal biometry is consistent with dates EFW 21%, AC 19%  DEEJAY 12.1 cm which is normal  BPP 8/8  The fetal anatomic survey was unable to be successfully obtained.   The visualized anatomy appears normal.    ASSESSMENT/COUNSELING:    Diagnoses and all orders for this visit:    1. Diet controlled gestational diabetes mellitus (GDM) in third trimester (Primary)    2. 35 weeks gestation of pregnancy         GDM--diet controlled   Counseling: I explained to her that patients with diabetes have a variety of pregnancy related risks that are related to their level of glycemic control.  We discussed  the increased risks related to diabetes including but not limitedfetal growth disorders (FGR or LGA), indicated or spontaneous  birth, macrosomia, polyhydramnios, birth trauma,  complications (jaundice, hypoglycemia, NICU admit) and stillbirth with uncontrolled glucose.       We reviewed the results of her glycemic profiling, which reveals optimal control. She has been checking 2 hour postprandials, all of which are normal.     given the excellent control, She can reduce to 4 times daily blood glucose monitoring from 7.    Reviewed the findings of ultrasound including the limited anatomy and the normal growth parameters.  She has a history of small infants.  We discussed that she should receive weekly  testing.      Summary of Plan  -Completion of anatomy with MFM in 4 weeks  -32 weeks: Weekly fetal  surveillance until delivery   -Starting at 28 weeks: Fetal movement instructions given continue daily until delivery; instructed to report to labor and delivery if cannot achieve more than 10 kicks in one hour or if she perceives a decrease in fetal movement    Follow-up: 2 weeks       Thank you for the consult and opportunity to care for this patient.  Please feel free to reach out with any questions or concerns.    I spent 30 minutes caring for this patient on this date of service. This time includes time spent by me in the following activities: preparing for the visit, reviewing tests, obtaining and/or reviewing a separately obtained history, performing a  medically appropriate examination and/or evaluation, counseling and educating the patient/family/caregiver and independently interpreting results and communicating that information with the patient/family/caregiver with greater than 50% spent in counseling and coordination of care.         I spent 8 minutes on the separately reported service of US imaging not included in the time used to support the E/M service also reported today.      Rafaela Larios MD   Maternal Fetal Medicine-[facility]  Office: [phone]

## 2025-02-12 NOTE — PROGRESS NOTES
From: Mekhi Sanchez  To: Re Padilla MD  Sent: 11/18/2022 1:55 PM CST  Subject: Med/order     Did you do an order for physical therapy?  I know we talked about it   Also I have couple of the cough pearls I still have cough refill on them and norco to [facility] Pt reports that she is doing well and denies vaginal bleeding or LOF at this time. Reports active fetal movement. Notes occasional abdominal tightness and cramping, denies consistency. Reviewed when to call OB office or present to L&D for evaluation with symptoms such as decreased fetal movement, vaginal bleeding, LOF or ctxs. Pt verbalized understanding. Denies HA, visual changes or epigastric pain. Denies any additional complaints at time of appointment. Next OB appointment scheduled for 2/12.      Patient provided with [facility] diabetes folder including diabetes packet, hypoglycemia packet, snacking guide and extra glucose logs. Reviewed [facility] recommendations of checking blood sugar 7 times a day: before meals, one hour after meals and before a bedtime snack. Reviewed carb goals (30-35g carbs with breakfast, 40-45g carbs with lunch/dinner and 15g carb snack in between meals and before bed) and blood sugars goals (60-90 before meals,  one hour after meals). Patient will send in blood sugars every Wednesday when not scheduled in office that week. Blood sugar logs on chart for [facility] to review. Finger stick performed in [facility] by Pretty CHÁVEZ RN on right hand with a value of 78 noted. Patient tolerated well.       Vitals:    02/12/25 1122   BP: 118/78   Pulse: 100   Temp: 98.4 °F (36.9 °C)

## 2025-02-12 NOTE — LETTER
February 12, 2025     MICHAEL Haro  [address] [place] [id number]    Patient: Taya Leonard   YOB: 2000   Date of Visit: 2/12/2025       Dear MICHAEL Haro,    Thank you for referring Taya Leonard to me for evaluation. Below is a copy of my consult note.    If you have questions, please do not hesitate to call me. I look forward to following Taya along with you.         Sincerely,        Rafaela Larios MD        CC: No Recipients    Pt reports that she is doing well and denies vaginal bleeding or LOF at this time. Reports active fetal movement. Notes occasional abdominal tightness and cramping, denies consistency. Reviewed when to call OB office or present to L&D for evaluation with symptoms such as decreased fetal movement, vaginal bleeding, LOF or ctxs. Pt verbalized understanding. Denies HA, visual changes or epigastric pain. Denies any additional complaints at time of appointment. Next OB appointment scheduled for 2/12.      Patient provided with [facility] diabetes folder including diabetes packet, hypoglycemia packet, snacking guide and extra glucose logs. Reviewed [facility] recommendations of checking blood sugar 7 times a day: before meals, one hour after meals and before a bedtime snack. Reviewed carb goals (30-35g carbs with breakfast, 40-45g carbs with lunch/dinner and 15g carb snack in between meals and before bed) and blood sugars goals (60-90 before meals,  one hour after meals). Patient will send in blood sugars every Wednesday when not scheduled in office that week. Blood sugar logs on chart for [facility] to review. Finger stick performed in [facility] by Pretty CHÁVEZ RN on right hand with a value of 78 noted. Patient tolerated well.       Vitals:    02/12/25 1122   BP: 118/78   Pulse: 100   Temp: 98.4 °F (36.9 °C)          MATERNAL FETAL MEDICINE Consult Note    Dear Dr Kira Gonzales, AP*:    Thank you for your kind referral of Taya Jaime  Aisha.  As you know, she is a 24 y.o.   35w1d gestation (Estimated Date of Delivery: 3/18/25). This is a consult.      Her antepartum course is complicated by:  Diet-controlled gestational diabetes      HPI: No contractions, LOF, vaginal bleeding. Normal fetal movement.   She denies headache, vision changes, shortness of breath, acute changes in edema, and RUQ pain.       Review of History:  Past Medical History:   Diagnosis Date   • Allergy to gluten    • Gastritis    • GDM (gestational diabetes mellitus)    • GERD (gastroesophageal reflux disease) 2023   • Raynaud's phenomenon      Past Surgical History:   Procedure Laterality Date   • COLONOSCOPY  2024   • COLONOSCOPY N/A 3/26/2024    Procedure: COLONOSCOPY;  Surgeon: Darwin Cid MD;  Location: [facility] ENDOSCOPY;  Service: Gastroenterology;  Laterality: N/A;  NORMAL COLONOSCOPY   • ENDOSCOPY  2024   • ENDOSCOPY N/A 3/26/2024    Procedure: ESOPHAGOGASTRODUODENOSCOPY WITH BIOPSIES;  Surgeon: Darwin Cid MD;  Location: [facility] ENDOSCOPY;  Service: Gastroenterology;  Laterality: N/A;  NORMAL ENDOSCOPY       OB Hx:    Social History     Socioeconomic History   • Marital status:    • Number of children: 2   Tobacco Use   • Smoking status: Never     Passive exposure: Never   • Smokeless tobacco: Never   Vaping Use   • Vaping status: Never Used   Substance and Sexual Activity   • Alcohol use: Never   • Drug use: Never   • Sexual activity: Yes     Partners: Male     Birth control/protection: Condom     Family History   Problem Relation Age of Onset   • Prostate cancer Paternal Grandfather    • Colon cancer Maternal Grandfather    • Breast cancer Neg Hx    • Ovarian cancer Neg Hx    • Uterine cancer Neg Hx       Allergies   Allergen Reactions   • Amoxicillin Rash      Current Outpatient Medications on File Prior to Visit   Medication Sig Dispense Refill   • ferrous sulfate 325 (65 FE) MG tablet Take 1 tablet by mouth  Daily With Breakfast. 30 tablet 1   • Glucose Blood (Blood Glucose Test) strip Use to check blood sugar 7 times a day. Please dispense test strips compatible with the One Touch Ultra 2 Glucometer. 200 each 11   • glucose monitor monitoring kit Use four times daily to check blood glucose. 1 each 0   • Lancets 30G misc Use to check blood sugar 7 times a day. Please dispense lancets compatible with the One Touch Ultra 2 Glucometer System. 200 each 10   • prenatal vitamin (prenatal, CLASSIC, vitamin) tablet Take 1 tablet by mouth Daily.     • omeprazole (priLOSEC) 20 MG capsule Take 1 capsule by mouth Daily. (Patient not taking: Reported on 1/29/2025) 30 capsule 5   • ondansetron ODT (ZOFRAN-ODT) 4 MG disintegrating tablet Place 1 tablet on the tongue Every 8 (Eight) Hours As Needed for Nausea or Vomiting. (Patient not taking: Reported on 2/12/2025) 10 tablet 0     No current facility-administered medications on file prior to visit.        Past obstetric, gynecological, medical, surgical, family and social history reviewed.  Relevant lab work and imaging reviewed.    Review of systems  As above in HPI     Vitals:    02/12/25 1122   BP: 118/78   BP Location: Right arm   Patient Position: Sitting   Pulse: 100   Temp: 98.4 °F (36.9 °C)   TempSrc: Temporal   Weight: 58 kg (127 lb 12.8 oz)       PHYSICAL EXAM   General: No acute distress  Respiratory: No increased work of breathing  Cardiac: Mild tachycardia  appropriate for pregnancy  Abdominal: Gravid, nontender  Extremities: No significant edema  Neuro/psych: Alert and oriented, appropriate mood      ULTRASOUND   Please view full ultrasound note on Imaging tab in ViewPoint.  Cephalic Presentation  Posterior placenta  Fetal biometry is consistent with dates EFW 21%, AC 19%  DEEJAY 12.1 cm which is normal  BPP 8/8  The fetal anatomic survey was unable to be successfully obtained.  The visualized anatomy appears normal.    ASSESSMENT/COUNSELING:    Diagnoses and all orders for  this visit:    1. Diet controlled gestational diabetes mellitus (GDM) in third trimester (Primary)    2. 35 weeks gestation of pregnancy         GDM--diet controlled   Counseling: I explained to her that patients with diabetes have a variety of pregnancy related risks that are related to their level of glycemic control.  We discussed  the increased risks related to diabetes including but not limitedfetal growth disorders (FGR or LGA), indicated or spontaneous  birth, macrosomia, polyhydramnios, birth trauma,  complications (jaundice, hypoglycemia, NICU admit) and stillbirth with uncontrolled glucose.       We reviewed the results of her glycemic profiling, which reveals optimal control. She has been checking 2 hour postprandials, all of which are normal.     given the excellent control, She can reduce to 4 times daily blood glucose monitoring from 7.    Reviewed the findings of ultrasound including the limited anatomy and the normal growth parameters.  She has a history of small infants.  We discussed that she should receive weekly  testing.      Summary of Plan  -Completion of anatomy with MFM in 4 weeks  -32 weeks: Weekly fetal  surveillance until delivery   -Starting at 28 weeks: Fetal movement instructions given continue daily until delivery; instructed to report to labor and delivery if cannot achieve more than 10 kicks in one hour or if she perceives a decrease in fetal movement    Follow-up: 2 weeks       Thank you for the consult and opportunity to care for this patient.  Please feel free to reach out with any questions or concerns.    I spent 30 minutes caring for this patient on this date of service. This time includes time spent by me in the following activities: preparing for the visit, reviewing tests, obtaining and/or reviewing a separately obtained history, performing a medically appropriate examination and/or evaluation, counseling and educating the  patient/family/caregiver and independently interpreting results and communicating that information with the patient/family/caregiver with greater than 50% spent in counseling and coordination of care.         I spent 8 minutes on the separately reported service of US imaging not included in the time used to support the E/M service also reported today.      Rafaela Larios MD   Maternal Fetal Medicine-[facility]  Office: [phone]

## 2025-02-12 NOTE — PROGRESS NOTES
Chief Complaint   Patient presents with    Routine Prenatal Visit     Routine Prenatal Visit, 35 Weeks gestation, Patient declined Tdap previously, Patient needs GBS done today, Patient also considering Cervix Check, patient is doing well overall       HPI: 24 y.o.  at 35w1d doing okay today having Cyndie Young some pelvic pressure  Good fetal movement denies any vaginal bleeding loss of fluid  Saw MFM today  Blood glucose log today with good blood sugar control    Relevant data reviewed:ENDOCRINOLOGY - SCAN - Glucose logs - MFM (2025)   [facility] (2025 12:32)     Office Visit with Rafaela Larios MD (2025)     Last OB US Data (since 2024)         Value Time User    EFW%ILE  12.7%ile 2025 12:40 PM Cale Del Rio MD    AC%ILE  18.6%ile 2025 12:40 PM Cale Del Rio MD          Vitals:    25 1600   BP: 118/88   Weight: 57.8 kg (127 lb 8 oz)     Total weight gain for pregnancy:  12.5 kg (27 lb 8 oz)    Cx exam:   /Closed/   Presentation: Vertex    Review of systems:   Gen: negative  CV:     negative  GI: negative  :   good fetal movement noted , cyndie young type contractions, and pelvic pressure  MS:    negative  Neuro: negative  Pul: negative    Physical Exam  Constitutional:       General: She is not in acute distress.  Pulmonary:      Effort: Pulmonary effort is normal.   Abdominal:      Palpations: Abdomen is soft.      Tenderness: There is no abdominal tenderness.   Skin:     General: Skin is warm.   Neurological:      Mental Status: She is alert.   Psychiatric:         Mood and Affect: Mood normal.         Thought Content: Thought content normal.         Judgment: Judgment normal.         A/P  1. Intrauterine pregnancy at 35w1d   2. Pregnancy Risk:  HIGH RISK    Diagnoses and all orders for this visit:    1. 35 weeks gestation of pregnancy (Primary)  -     POC Urinalysis Dipstick    2.  screening for streptococcus B  -      Group B Strep Molecular by PCR - Swab, Vaginal/Rectum    3. Vaginal discharge  -     NuSwab VG+ - Swab, Vagina    4. [place] Hick's contraction    5. Diet controlled gestational diabetes mellitus (GDM), antepartum        Pre-eclampsia symptoms were discussed and warnings were given.   labor was discussed.  Warnings were provided.  Nutrition and weight gain were addressed.  -----------------------  PLAN: PTB discussed  Cervical check closed today   continue with good blood sugar control  Plan BPP next week      Return in about 1 week (around 2025) for BPP.    Kira Gonzales, APRN  2025 16:56 EST

## 2025-02-13 LAB
GLUCOSE BLDC GLUCOMTR-MCNC: 77 MG/DL (ref 70–130)
GP B STREP DNA VAG+RECTUM QL NAA+PROBE: NEGATIVE

## 2025-02-15 ENCOUNTER — HOSPITAL ENCOUNTER (OUTPATIENT)
Facility: HOSPITAL | Age: 25
Discharge: HOME | End: 2025-02-15
Attending: OBSTETRICS & GYNECOLOGY | Admitting: OBSTETRICS & GYNECOLOGY
Payer: COMMERCIAL

## 2025-02-15 VITALS
OXYGEN SATURATION: 100 % | RESPIRATION RATE: 17 BRPM | TEMPERATURE: 97.6 F | DIASTOLIC BLOOD PRESSURE: 57 MMHG | SYSTOLIC BLOOD PRESSURE: 94 MMHG | HEART RATE: 97 BPM

## 2025-02-15 LAB
A VAGINAE DNA VAG QL NAA+PROBE: NORMAL SCORE
A1 MICROGLOB PLACENTAL VAG QL: NEGATIVE
BILIRUB BLD-MCNC: NEGATIVE MG/DL
BVAB2 DNA VAG QL NAA+PROBE: NORMAL SCORE
C ALBICANS DNA VAG QL NAA+PROBE: NEGATIVE
C GLABRATA DNA VAG QL NAA+PROBE: NEGATIVE
C TRACH DNA SPEC QL NAA+PROBE: NEGATIVE
CLARITY, POC: CLEAR
COLOR UR: YELLOW
GLUCOSE UR STRIP-MCNC: NEGATIVE MG/DL
KETONES UR QL: ABNORMAL
LEUKOCYTE EST, POC: NEGATIVE
MEGA1 DNA VAG QL NAA+PROBE: NORMAL SCORE
N GONORRHOEA DNA VAG QL NAA+PROBE: NEGATIVE
NITRITE UR-MCNC: NEGATIVE MG/ML
PH UR: 6.5 [PH] (ref 5–8)
PROT UR STRIP-MCNC: NEGATIVE MG/DL
RBC # UR STRIP: NEGATIVE /UL
SP GR UR: 1 (ref 1–1.03)
T VAGINALIS DNA VAG QL NAA+PROBE: NEGATIVE
UROBILINOGEN UR QL: ABNORMAL

## 2025-02-15 PROCEDURE — 59025 FETAL NON-STRESS TEST: CPT

## 2025-02-15 PROCEDURE — 81002 URINALYSIS NONAUTO W/O SCOPE: CPT | Performed by: OBSTETRICS & GYNECOLOGY

## 2025-02-15 PROCEDURE — 84112 EVAL AMNIOTIC FLUID PROTEIN: CPT | Performed by: OBSTETRICS & GYNECOLOGY

## 2025-02-15 PROCEDURE — G0463 HOSPITAL OUTPT CLINIC VISIT: HCPCS

## 2025-02-15 NOTE — DISCHARGE INSTR - ACTIVITY
ADULT BEHAVIORAL HEALTH FOLLOW UP  JESSE Lizama  Licensed Independent        Visit Date: 9/1/2022   Time of appointment:  [phone]   Time spent with Patient: 30 minutes. This is patient's ninth appointment. Reason for Consult:  Anxiety     Referring Provider/PCP:    No ref. provider found  ROSELINE Loco - CNP      Pt provided informed consent for the behavioral health program. Discussed with patient model of service to include the limits of confidentiality (i.e. abuse reporting, suicide intervention, etc.) and short-term intervention focused approach. Pt indicated understanding. Luis A Velasco is a 37 y.o. female who presents for follow up of anxiety. She reports doing a lot better, trying to decrease communication and social media attention with her ex, realizing it most likely is over with him. She reports going on a date, processed how this went and ways she is trying to not take it all so seriously. She is noted to be in a much better mood, much less anxious, more relaxed. She acknowledged this, states \"I was always worried what was going on\", feeling a lot less concern and able to enjoy daily activities, which she is increasing as well. Previous Recommendations: Delia Mayers will engage in opposite action to help her move thru her emotions. She will follow up with Mauri oCrey in 2 weeks. MENTAL STATUS EXAM  Mood was within normal limits with calm affect. Suicidal ideation was denied. Homicidal ideation was denied. Hygiene was good . Dress was appropriate. Behavior was Within Normal Limits with No observation or self-report of difficulties ambulating. Attitude was Cooperative. Eye-contact was good. Speech: rate - WNL, rhythm - WNL, volume - WNL. Verbalizations were coherent. Thought processes were intact and goal-oriented without evidence of delusions, hallucinations, obsessions, or jose; with moderate cognitive distortions.    Associations As tolerated

## 2025-02-15 NOTE — NON STRESS TEST
Obstetrical Non-stress Test Interpretation     Name:  Taya Leonard  MRN: [id number]    24 y.o. female  at 35w4d    Indication: Leaking of fluid and contractions      Fetal Assessment  Fetal Movement: active  Fetal HR Assessment Method: external  Fetal HR (beats/min): 135  Fetal HR Baseline: normal range  Fetal HR Variability: moderate (amplitude range 6 to 25 bpm)  Fetal HR Accelerations: greater than/equal to 15 bpm, lasting at least 15 seconds  Fetal HR Decelerations: absent  Sinusoidal Pattern Present: absent    BP 94/57   Pulse 97   Temp 97.6 °F (36.4 °C) (Oral)   Resp 17   LMP 2024 (Exact Date)   SpO2 100%     Reason for test: OB Triage  Date of Test: 2/15/2025  Time frame of test:   RN NST Interpretation: Reactive      Yuli Coates RN  2/15/2025  03:40 EST

## 2025-02-15 NOTE — OBED NOTES
THEO Tracy  Obstetric History and Physical    Chief Complaint   Patient presents with    Leaking Fluid     Started at midnight    Contractions     Started yesterday       Subjective     PNC provided by:  [facility]    HPI:    Patient is a 24 y.o. female  currently at 35w4d, who presents with LOF and BH ctx;  pt with ctx since last night;  no vb;  good FM.    Her prenatal care is complicated by  Diabetes GDM A1.  Her previous obstetric/gynecological history is noted for is non-contributory.    The following portions of the patients history were reviewed and updated as appropriate:   current medications, allergies, past medical history, past surgical history, past family history, past social history and current problem list.     Prenatal Information:  Prenatal Results       Initial Prenatal Labs       Test Value Reference Range Date Time    Hemoglobin  13.8 g/dL 12.0 - 15.9 10/11/24 1911       14.7 g/dL 12.0 - 15.9 24 1249    Hematocrit  41.0 % 34.0 - 46.6 10/11/24 1911       43.9 % 34.0 - 46.6 24 1249    Platelets  239 10*3/mm3 140 - 450 10/11/24 191       218 10*3/mm3 140 - 450 24 1249    Rubella IgG  2.10 index Immune >0.99 24 1249    Hepatitis B SAg  Non-Reactive  Non-Reactive 24 1249    Hepatitis C Ab  Non-Reactive  Non-Reactive 24 1249    RPR  Non Reactive  Non Reactive 24 1407       Non-Reactive  Non-Reactive 24 1249    T. Pallidum Ab         ABO  O   24 1249    Rh  Positive   24 1249    Antibody Screen  Negative   24 1249    HIV  Non-Reactive  Non-Reactive 24 1249    Urine Culture  No growth   24 1252    Gonorrhea  Negative  Negative 24 1603    Chlamydia  Negative  Negative 24 1603    TSH        HgB A1c         Varicella IgG        Hemoglobinopathy Fractionation        Hemoglobinopathy (genetic testing)        Cystic fibrosis         Spinal muscular atrophy        Fragile X                  Fetal testing        Test  Value Reference Range Date Time    NIPT        MSAFP        AFP-4                  2nd and 3rd Trimester       Test Value Reference Range Date Time    Hemoglobin (repeated)  11.4 g/dL 12.0 - 15.9 01/29/25 0959       11.6 g/dL 12.0 - 15.9 12/27/24 1407    Hematocrit (repeated)  35.1 % 34.0 - 46.6 01/29/25 0959       34.8 % 34.0 - 46.6 12/27/24 1407    Platelets   261 10*3/mm3 140 - 450 01/29/25 0959       214 10*3/mm3 140 - 450 12/27/24 1407       239 10*3/mm3 140 - 450 10/11/24 1911       218 10*3/mm3 140 - 450 08/08/24 1249    1 hour GTT   145 mg/dL 70 - 139 12/27/24 1407    Antibody Screen (repeated)        3rd TM syphilis scrn (repeated)  RPR   Non Reactive  Non Reactive 12/27/24 1407    3rd TM syphilis scrn (repeated) TP-Ab        3rd TM syphilis screen TB-Ab (FTA)        Syphilis cascade test TP-Ab (EIA)        Syphilis cascade TPPA        GTT Fasting  76 mg/dL 65 - 94 01/14/25 0949    GTT 1 Hr  172 mg/dL 65 - 179 01/14/25 1106    GTT 2 Hr  178 mg/dL 65 - 154 01/14/25 1200    GTT 3 Hr  173 mg/dL 65 - 139 01/14/25 1257    Group B Strep  Negative  Negative 02/12/25 1628              Other testing        Test Value Reference Range Date Time    Parvo IgG         CMV IgG                   Drug Screening       Test Value Reference Range Date Time    Amphetamine Screen        Barbiturate Screen        Benzodiazepine Screen        Methadone Screen        Phencyclidine Screen        Opiates Screen        THC Screen        Cocaine Screen        Propoxyphene Screen        Buprenorphine Screen        Methamphetamine Screen        Oxycodone Screen        Tricyclic Antidepressants Screen                  Legend    ^: Historical                          External Prenatal Results       Pregnancy Outside Results - Transcribed From Office Records - See Scanned Records For Details       Test Value Date Time    ABO  O  08/08/24 1249    Rh  Positive  08/08/24 1249    Antibody Screen  Negative  08/08/24 1249    Varicella IgG        Rubella  2.10 index 08/08/24 1249    Hgb  11.4 g/dL 01/29/25 0959       11.6 g/dL 12/27/24 1407       13.8 g/dL 10/11/24 1911       14.7 g/dL 08/08/24 1249    Hct  35.1 % 01/29/25 0959       34.8 % 12/27/24 1407       41.0 % 10/11/24 1911       43.9 % 08/08/24 1249    HgB A1c        1h GTT  145 mg/dL 12/27/24 1407    3h GTT Fasting  76 mg/dL 01/14/25 0949    3h GTT 1 hour  172 mg/dL 01/14/25 1106    3h GTT 2 hour  178 mg/dL 01/14/25 1200    3h GTT 3 hour   173 mg/dL 01/14/25 1257    Gonorrhea (discrete)  Negative  08/07/24 1603    Chlamydia (discrete)  Negative  08/07/24 1603    RPR  Non Reactive  12/27/24 1407       Non-Reactive  08/08/24 1249    Syphils cascade: TP-Ab (FTA)       TP-Ab       TP-Ab (EIA)       TPPA       HBsAg  Non-Reactive  08/08/24 1249    Herpes Simplex Virus PCR       Herpes Simplex VIrus Culture       HIV  Non-Reactive  08/08/24 1249    Hep C RNA Quant PCR       Hep C Antibody  Non-Reactive  08/08/24 1249    AFP       NIPT       Cystic Fibrosis (Tian)       Cystic Fibroisis        Spinal Muscular atrophy       Fragile X       Group B Strep  Negative  02/12/25 1628    GBS Susceptibility to Clindamycin       GBS Susceptibility to Erythromycin       Fetal Fibronectin       Genetic Testing, Maternal Blood                 Drug Screening       Test Value Date Time    Urine Drug Screen       Amphetamine Screen       Barbiturate Screen       Benzodiazepine Screen       Methadone Screen       Phencyclidine Screen       Opiates Screen       THC Screen       Cocaine Screen       Propoxyphene Screen       Buprenorphine Screen       Methamphetamine Screen       Oxycodone Screen       Tricyclic Antidepressants Screen                 Legend    ^: Historical                             Problem List:     Patient Active Problem List   Diagnosis    Lymph node enlargement    Generalized abdominal pain    Gastroesophageal reflux disease without esophagitis    Other dysphagia    Upper abdominal pain    Screening  for viral disease    Blood in stool    Rectal bleeding    Dyspepsia    Dysphagia    Globus sensation    Pain of upper abdomen    FH: colon cancer    Poor fetal growth affecting management of mother in third trimester    Abnormal glucose tolerance test        Past OB History:     OB History    Para Term  AB Living   4 2 2 0 1 2   SAB IAB Ectopic Molar Multiple Live Births   1 0 0 0 0 2      # Outcome Date GA Lbr Chase/2nd Weight Sex Type Anes PTL Lv   4 Current            3 SAB 22 4w0d   U    FD   2 Term 20 38w0d  2722 g (6 lb) F Vag-Spont EPI N THU   1 Term 18 37w0d  2495 g (5 lb 8 oz) F Vag-Spont EPI N THU       Past Medical History: Past Medical History:   Diagnosis Date    Allergy to gluten     Gastritis     GDM (gestational diabetes mellitus)     GERD (gastroesophageal reflux disease) 2023    Raynaud's phenomenon       Past Surgical History Past Surgical History:   Procedure Laterality Date    COLONOSCOPY  2024    COLONOSCOPY N/A 3/26/2024    Procedure: COLONOSCOPY;  Surgeon: Darwin iCd MD;  Location: [facility] ENDOSCOPY;  Service: Gastroenterology;  Laterality: N/A;  NORMAL COLONOSCOPY    ENDOSCOPY  2024    ENDOSCOPY N/A 3/26/2024    Procedure: ESOPHAGOGASTRODUODENOSCOPY WITH BIOPSIES;  Surgeon: Darwin Cid MD;  Location: [facility] ENDOSCOPY;  Service: Gastroenterology;  Laterality: N/A;  NORMAL ENDOSCOPY      Family History: Family History   Problem Relation Age of Onset    Prostate cancer Paternal Grandfather     Colon cancer Maternal Grandfather     Breast cancer Neg Hx     Ovarian cancer Neg Hx     Uterine cancer Neg Hx       Social History:  reports that she has never smoked. She has never been exposed to tobacco smoke. She has never used smokeless tobacco.   reports no history of alcohol use.   reports no history of drug use.        General ROS: Pertinent items are noted in HPI        Home Medications:  Blood Glucose Test, Lancets 30G, ONE  TOUCH ULTRA 2, ferrous sulfate, glucose monitor, and prenatal vitamin    Allergies:  Allergies   Allergen Reactions    Amoxicillin Rash       Objective       Vital Signs Range for the last 24 hours  Temperature: Temp:  [97.6 °F (36.4 °C)] 97.6 °F (36.4 °C)   Temp Source: Temp src: Oral   BP: BP: ()/(57-89) 94/57   Pulse: Heart Rate:  [] 97   Respirations: Resp:  [17] 17   SPO2: SpO2:  [98 %-100 %] 100 %     Physical Examination:   General appearance - alert, well appearing, and in no distress  Mental status - alert, oriented to person, place, and time  Abdomen - soft, nontender, nondistended,   Pelvic - normal external genitalia, vulva, vagina, cervix, and uterus   Back exam - full range of motion, no tenderness or pain on motion  Neurological - alert, oriented, normal speech  Extremities - peripheral pulses normal  Skin - normal coloration and turgor, no suspicious skin lesions noted    Presentation: vtx   Cervix: Method: sterile vaginal exam performed (Yuli Coates RN)   Dilation: Cervical Dilation (cm): 0-1   Effacement: Cervical Effacement: 80   Station:         Fetal Heart Rate Assessment   Method: Fetal HR Assessment Method: external   Beats/min: Fetal HR (beats/min): 135   Baseline: Fetal HR Baseline: normal range   Variability: Fetal HR Variability: moderate (amplitude range 6 to 25 bpm)   Accels: Fetal HR Accelerations: greater than/equal to 15 bpm, lasting at least 15 seconds   Decels: Fetal HR Decelerations: absent   Tracing Category:       Uterine Assessment   Method: Method: palpation, external tocotransducer   Frequency (min): Contraction Frequency (Minutes): 2-3   Ctx Count in 10 min:     Duration:     Intensity: Contraction Intensity: mild by palpation   Nancy Units:       Lab Results (last 24 hours)       Procedure Component Value Units Date/Time    POC Urinalysis Dipstick [[id number]]  (Abnormal) Collected: 02/15/25 0059    Specimen: Urine Updated: 02/15/25 0133     Color  Yellow     Clarity, UA Clear     Glucose, UA Negative mg/dL      Bilirubin Negative     Ketones, UA Trace     Specific Gravity  1.005     Blood, UA Negative     pH, Urine 6.5     Protein, POC Negative mg/dL      Urobilinogen, UA 0.2 E.U./dL     Leukocytes Negative     Nitrite, UA Negative    Rapid Assay For ROM - Amniotic Fluid, Amniotic Sac [[id number]]  (Normal) Collected: 02/15/25 0117    Specimen: Amniotic Fluid from Amniotic Sac Updated: 02/15/25 0136     Rupture of Membranes Negative    Narrative:      This test detects tiny amounts of amniotic fluid and is  approved for use at any gestational age. When there is   significant presence of blood on the swab, the test can   malfunction and is not recommended (possible false positive  results). In cases of only trace amounts of blood on the swab,  the test still functions properly and will not interfere with  the test results. In very rare cases when a sample is taken  12 hours or later after a rupture, a false negative result may  occur due to obstruction of the rupture by fetus or resealing  of the amniotic sac.             GBS is negative     Assessment & Plan     False PTL    Assessment:  1.  Intrauterine pregnancy at 35w4d gestation with reactive fetal status.    2.   labor  false  3.  Obstetrical history significant for is remarkable for A1GDM  4.  GBS status:   Group B Strep, DNA   Date Value Ref Range Status   2025 Negative Negative Final       Plan:  1. Discharge to home.  As no evidence of PTL;  precautions given;  keep appt next week  2.  Plan of care has been reviewed with patient and patient agrees.   3.  Risks, benefits of treatment plan have been discussed.  4.  All questions have been answered.        Electronically signed by Dyan Ron MD, 02/15/25, 3:51 AM EST.

## 2025-02-15 NOTE — NURSING NOTE
"25 YO A1 @ 35 4/7 weeks gestation presents with c/o leaking of fluid \"trickling down my leg\" starting at midnight tonight. Reports clear. No visible leaking of fluid currently. Reports contractions \"tightening and pressure\" occurring \"all day yesterday.\" Denies vaginal bleeding or headache. Denies blurred vision. Reports history of GDM, diet controlled with all of blood sugars 2 HR PP and fasting yesterday WNL. Reports good fetal movement and denies any vaginal bleeding. See navigators for triage  "

## 2025-02-18 ENCOUNTER — ROUTINE PRENATAL (OUTPATIENT)
Age: 25
End: 2025-02-18
Payer: COMMERCIAL

## 2025-02-18 VITALS — DIASTOLIC BLOOD PRESSURE: 84 MMHG | SYSTOLIC BLOOD PRESSURE: 122 MMHG | BODY MASS INDEX: 21.99 KG/M2 | WEIGHT: 128.1 LBS

## 2025-02-18 DIAGNOSIS — R10.2 PELVIC PRESSURE IN PREGNANCY, THIRD TRIMESTER: ICD-10-CM

## 2025-02-18 DIAGNOSIS — O47.9 BRAXTON HICK'S CONTRACTION: ICD-10-CM

## 2025-02-18 DIAGNOSIS — Z3A.36 36 WEEKS GESTATION OF PREGNANCY: Primary | ICD-10-CM

## 2025-02-18 DIAGNOSIS — O24.410 DIET CONTROLLED GESTATIONAL DIABETES MELLITUS (GDM) IN THIRD TRIMESTER: ICD-10-CM

## 2025-02-18 DIAGNOSIS — O26.893 PELVIC PRESSURE IN PREGNANCY, THIRD TRIMESTER: ICD-10-CM

## 2025-02-18 LAB
GLUCOSE UR STRIP-MCNC: NEGATIVE MG/DL
PROT UR STRIP-MCNC: NEGATIVE MG/DL

## 2025-02-18 NOTE — PROGRESS NOTES
Chief Complaint   Patient presents with    Routine Prenatal Visit     OB Check, Pt is 36w0d, BPP today, Pt reports having some contractions and more pressure since Saturday 02/15       HPI: 24 y.o.  at 36w0d   Having good FM  Has random contractions no patten  That go away with rest or fluids  pelvic pressure off and on  Went to ld on Saturday thought her water broke   Was sent home with false PTL      Relevant data reviewed: US Fetal Biophysical Profile;Without Non-Stress Testing (2025 12:58)     Last OB US Data (since 2024)         Value Time User    EFW%ILE  12.7%ile 2025 12:40 PM Cale Del Rio MD    AC%ILE  18.6%ile 2025 12:40 PM Cale Del Rio MD          Vitals:    25 1301   BP: 122/84   Weight: 58.1 kg (128 lb 1.6 oz)     Total weight gain for pregnancy:  12.7 kg (28 lb 1.6 oz)    Cx exam:  70/ (1-2)/-3  Presentation: Vertex    Review of systems:   Gen: negative  CV:     negative  GI: negative  :   good fetal movement noted , cyndie vinson type contractions, and pelvic pressure  MS:    negative  Neuro: negative and denies headaches and visual changes   Pul: negative    Physical Exam  Constitutional:       General: She is not in acute distress.  Pulmonary:      Effort: Pulmonary effort is normal.   Abdominal:      Palpations: Abdomen is soft.      Tenderness: There is no abdominal tenderness.   Skin:     General: Skin is warm.   Neurological:      Mental Status: She is alert.   Psychiatric:         Mood and Affect: Mood normal.         Thought Content: Thought content normal.         Judgment: Judgment normal.         A/P  1. Intrauterine pregnancy at 36w0d   2. Pregnancy Risk:  HIGH RISK     Diagnoses and all orders for this visit:    1. 36 weeks gestation of pregnancy (Primary)  -     POC Urinalysis Dipstick    2. [place] Hick's contraction    3. Pelvic pressure in pregnancy, third trimester    4. Diet controlled gestational diabetes mellitus (GDM) in third  trimester         labor was discussed.  Warnings were provided.  Nutrition and weight gain were addressed.  -----------------------  PLAN: Bpp   Discussed PTB precautions  BS log good control  GBS is negative   Return in about 1 week (around 2025) for BPP.    Kira Gonzales, APRN  2025 13:23 EST

## 2025-02-19 ENCOUNTER — DOCUMENTATION (OUTPATIENT)
Dept: OBSTETRICS AND GYNECOLOGY | Facility: CLINIC | Age: 25
End: 2025-02-19
Payer: COMMERCIAL

## 2025-02-19 NOTE — PROGRESS NOTES
Weekly MFM glucose log evaluation. No adjustments needed today. Pt is not currently on medications at this time.   MFM will review glucose logs again at next appointment on 2/27.

## 2025-02-21 ENCOUNTER — TELEPHONE (OUTPATIENT)
Dept: OBSTETRICS AND GYNECOLOGY | Facility: CLINIC | Age: 25
End: 2025-02-21
Payer: COMMERCIAL

## 2025-02-21 NOTE — TELEPHONE ENCOUNTER
Attempt to call [employer] regarding prior auth noticed received from pharmacy for test strips. No answer. Voicemail left stating reason for call and encouraging patient to notify M if she has any issues picking up prescriptions.

## 2025-02-24 ENCOUNTER — TRANSCRIBE ORDERS (OUTPATIENT)
Dept: ULTRASOUND IMAGING | Facility: HOSPITAL | Age: 25
End: 2025-02-24
Payer: COMMERCIAL

## 2025-02-24 DIAGNOSIS — O24.419 GESTATIONAL DIABETES MELLITUS (GDM), ANTEPARTUM, GESTATIONAL DIABETES METHOD OF CONTROL UNSPECIFIED: Primary | ICD-10-CM

## 2025-02-26 ENCOUNTER — ROUTINE PRENATAL (OUTPATIENT)
Age: 25
End: 2025-02-26
Payer: COMMERCIAL

## 2025-02-26 VITALS — WEIGHT: 123.8 LBS | DIASTOLIC BLOOD PRESSURE: 70 MMHG | BODY MASS INDEX: 21.25 KG/M2 | SYSTOLIC BLOOD PRESSURE: 110 MMHG

## 2025-02-26 DIAGNOSIS — O26.893 PELVIC PRESSURE IN PREGNANCY, THIRD TRIMESTER: ICD-10-CM

## 2025-02-26 DIAGNOSIS — O24.410 DIET CONTROLLED GESTATIONAL DIABETES MELLITUS (GDM) IN THIRD TRIMESTER: ICD-10-CM

## 2025-02-26 DIAGNOSIS — O47.9 BRAXTON HICK'S CONTRACTION: ICD-10-CM

## 2025-02-26 DIAGNOSIS — R10.2 PELVIC PRESSURE IN PREGNANCY, THIRD TRIMESTER: ICD-10-CM

## 2025-02-26 DIAGNOSIS — Z3A.37 37 WEEKS GESTATION OF PREGNANCY: Primary | ICD-10-CM

## 2025-02-26 PROBLEM — I73.00 RAYNAUD'S DISEASE: Status: ACTIVE | Noted: 2022-01-22

## 2025-02-26 PROBLEM — Z30.09 FAMILY PLANNING COUNSELING: Status: ACTIVE | Noted: 2023-01-04

## 2025-02-26 PROBLEM — Z87.59 HISTORY OF MISCARRIAGE: Status: ACTIVE | Noted: 2023-01-04

## 2025-02-26 PROBLEM — R22.32 AXILLARY MASS, LEFT: Status: ACTIVE | Noted: 2023-01-04

## 2025-02-26 PROBLEM — Z31.69 ENCOUNTER FOR PRECONCEPTION CONSULTATION: Status: ACTIVE | Noted: 2023-01-05

## 2025-02-26 LAB
GLUCOSE UR STRIP-MCNC: NEGATIVE MG/DL
PROT UR STRIP-MCNC: NEGATIVE MG/DL

## 2025-02-26 NOTE — PROGRESS NOTES
Chief Complaint   Patient presents with    Routine Prenatal Visit     OB Check, Pt is 37w1d, BPP today, Pt has no complaints today, Doing well        HPI: 24 y.o.  at 37w1d having cyndie vinson  Sometimes they are painful  They will go away with rest and fluids   Pelvic pressure  Good BS control    Relevant data reviewed: US Fetal Biophysical Profile;Without Non-Stress Testing (2025 10:25)     Last OB US Data (since 8/10/2024)         Value Time User    EFW%ILE  12.7%ile 2025 12:40 PM Cale Del Rio MD    AC%ILE  18.6%ile 2025 12:40 PM Cale Del Rio MD    Placenta location  Posterior 2025 10:37 AM Kira Gonzales APRN          Vitals:    25 1024   BP: 110/70   Weight: 56.2 kg (123 lb 12.8 oz)     Total weight gain for pregnancy:  10.8 kg (23 lb 12.8 oz)    Cx exam:  70/ (2-3)/-3  Presentation: Vertex    Review of systems:   Gen: negative  CV:     negative  GI: negative  :   good fetal movement noted , cyndie vinson type contractions, and pelvic pressure  MS:    negative  Neuro: negative and denies headaches and visual changes   Pul: negative    Physical Exam  Constitutional:       General: She is not in acute distress.  Pulmonary:      Effort: Pulmonary effort is normal.   Abdominal:      Palpations: Abdomen is soft.      Tenderness: There is no abdominal tenderness.   Skin:     General: Skin is warm.   Neurological:      Mental Status: She is alert.   Psychiatric:         Mood and Affect: Mood normal.         Thought Content: Thought content normal.         Judgment: Judgment normal.         A/P  1. Intrauterine pregnancy at 37w1d   2. Pregnancy Risk:  HIGH RISK     Diagnoses and all orders for this visit:    1. 37 weeks gestation of pregnancy (Primary)  -     POC Urinalysis Dipstick    2. Pelvic pressure in pregnancy, third trimester    3. Diet controlled gestational diabetes mellitus (GDM) in third trimester    4. Cyndie Hick's contraction        Routine labor  warnings were discussed and indications for L & D f/u including bleeding, regular contractions, decreased fetal movement or/and rupture of membranes.   Nutrition and weight gain were addressed.  -----------------------  PLAN: labor precautions  Has appt with MFM tomorrow  IOL for 3/10/25  BPP 8/8  Return in about 1 week (around 3/5/2025), or BPP.    Kira Gonzales, APRN  2/26/2025 10:40 EST

## 2025-02-27 ENCOUNTER — DOCUMENTATION (OUTPATIENT)
Dept: OBSTETRICS AND GYNECOLOGY | Facility: CLINIC | Age: 25
End: 2025-02-27
Payer: COMMERCIAL

## 2025-02-27 NOTE — PROGRESS NOTES
Weekly MFM glucose log evaluation performed via [employer] since patient needed to rescheduled appointment.   No adjustments needed today. Patient is not currently on medications for glucose management at this time.   MFM will review glucose logs again at next appointment on 3/5.

## 2025-03-03 ENCOUNTER — HOSPITAL ENCOUNTER (INPATIENT)
Facility: HOSPITAL | Age: 25
LOS: 2 days | Discharge: HOME | End: 2025-03-05
Attending: OBSTETRICS & GYNECOLOGY | Admitting: OBSTETRICS & GYNECOLOGY
Payer: COMMERCIAL

## 2025-03-03 ENCOUNTER — ANESTHESIA (OUTPATIENT)
Dept: LABOR AND DELIVERY | Facility: HOSPITAL | Age: 25
End: 2025-03-03
Payer: COMMERCIAL

## 2025-03-03 ENCOUNTER — ANESTHESIA EVENT (OUTPATIENT)
Dept: LABOR AND DELIVERY | Facility: HOSPITAL | Age: 25
End: 2025-03-03
Payer: COMMERCIAL

## 2025-03-03 ENCOUNTER — HOSPITAL ENCOUNTER (OUTPATIENT)
Facility: HOSPITAL | Age: 25
Discharge: HOME | End: 2025-03-03
Attending: OBSTETRICS & GYNECOLOGY | Admitting: OBSTETRICS & GYNECOLOGY
Payer: COMMERCIAL

## 2025-03-03 ENCOUNTER — ROUTINE PRENATAL (OUTPATIENT)
Age: 25
End: 2025-03-03
Payer: COMMERCIAL

## 2025-03-03 VITALS
OXYGEN SATURATION: 99 % | TEMPERATURE: 97.7 F | SYSTOLIC BLOOD PRESSURE: 113 MMHG | DIASTOLIC BLOOD PRESSURE: 84 MMHG | HEART RATE: 88 BPM | RESPIRATION RATE: 18 BRPM

## 2025-03-03 VITALS — BODY MASS INDEX: 21.83 KG/M2 | DIASTOLIC BLOOD PRESSURE: 98 MMHG | SYSTOLIC BLOOD PRESSURE: 142 MMHG | WEIGHT: 127.2 LBS

## 2025-03-03 DIAGNOSIS — O13.3 GESTATIONAL HYPERTENSION, THIRD TRIMESTER: ICD-10-CM

## 2025-03-03 DIAGNOSIS — O09.93 HIGH-RISK PREGNANCY IN THIRD TRIMESTER: ICD-10-CM

## 2025-03-03 DIAGNOSIS — O24.410 DIET CONTROLLED GESTATIONAL DIABETES MELLITUS (GDM) IN THIRD TRIMESTER: ICD-10-CM

## 2025-03-03 DIAGNOSIS — Z3A.37 37 WEEKS GESTATION OF PREGNANCY: Primary | ICD-10-CM

## 2025-03-03 PROBLEM — O42.90 LEAKAGE OF AMNIOTIC FLUID: Status: ACTIVE | Noted: 2025-03-03

## 2025-03-03 PROBLEM — O13.9 GESTATIONAL HYPERTENSION: Status: ACTIVE | Noted: 2025-03-03

## 2025-03-03 LAB
A1 MICROGLOB PLACENTAL VAG QL: NEGATIVE
ABO GROUP BLD: NORMAL
ALBUMIN SERPL-MCNC: 2.9 G/DL (ref 3.5–5.2)
ALBUMIN/GLOB SERPL: 0.8 G/DL
ALP SERPL-CCNC: 254 U/L (ref 39–117)
ALT SERPL W P-5'-P-CCNC: 16 U/L (ref 1–33)
AMPHET+METHAMPHET UR QL: NEGATIVE
AMPHETAMINES UR QL: NEGATIVE
ANION GAP SERPL CALCULATED.3IONS-SCNC: 10.6 MMOL/L (ref 5–15)
AST SERPL-CCNC: 18 U/L (ref 1–32)
ATMOSPHERIC PRESS: 744.1 MMHG
ATMOSPHERIC PRESS: 746.7 MMHG
BARBITURATES UR QL SCN: NEGATIVE
BASE EXCESS BLDCOA CALC-SCNC: -5.2 MMOL/L (ref -2–2)
BASE EXCESS BLDCOV CALC-SCNC: -4.2 MMOL/L (ref -30–30)
BASOPHILS # BLD AUTO: 0.02 10*3/MM3 (ref 0–0.2)
BASOPHILS NFR BLD AUTO: 0.3 % (ref 0–1.5)
BENZODIAZ UR QL SCN: NEGATIVE
BILIRUB BLD-MCNC: NEGATIVE MG/DL
BILIRUB SERPL-MCNC: <0.2 MG/DL (ref 0–1.2)
BLD GP AB SCN SERPL QL: NEGATIVE
BUN SERPL-MCNC: 10 MG/DL (ref 6–20)
BUN/CREAT SERPL: 13.7 (ref 7–25)
BUPRENORPHINE SERPL-MCNC: NEGATIVE NG/ML
CALCIUM SPEC-SCNC: 8.3 MG/DL (ref 8.6–10.5)
CANNABINOIDS SERPL QL: NEGATIVE
CHLORIDE SERPL-SCNC: 99 MMOL/L (ref 98–107)
CLARITY, POC: CLEAR
CO2 SERPL-SCNC: 23.4 MMOL/L (ref 22–29)
COCAINE UR QL: NEGATIVE
COLOR UR: YELLOW
CREAT SERPL-MCNC: 0.73 MG/DL (ref 0.57–1)
CREAT UR-MCNC: 54.7 MG/DL
DEPRECATED RDW RBC AUTO: 63.5 FL (ref 37–54)
DEPRECATED RDW RBC AUTO: 64.6 FL (ref 37–54)
EGFRCR SERPLBLD CKD-EPI 2021: 116.5 ML/MIN/1.73
EOSINOPHIL # BLD AUTO: 0.04 10*3/MM3 (ref 0–0.4)
EOSINOPHIL NFR BLD AUTO: 0.6 % (ref 0.3–6.2)
ERYTHROCYTE [DISTWIDTH] IN BLOOD BY AUTOMATED COUNT: 20.6 % (ref 12.3–15.4)
ERYTHROCYTE [DISTWIDTH] IN BLOOD BY AUTOMATED COUNT: 21.2 % (ref 12.3–15.4)
FENTANYL UR-MCNC: NEGATIVE NG/ML
GLOBULIN UR ELPH-MCNC: 3.6 GM/DL
GLUCOSE SERPL-MCNC: 76 MG/DL (ref 65–99)
GLUCOSE UR STRIP-MCNC: NEGATIVE MG/DL
GLUCOSE UR STRIP-MCNC: NEGATIVE MG/DL
HCO3 BLDCOA-SCNC: 21.5 MMOL/L
HCO3 BLDCOV-SCNC: 20.5 MMOL/L
HCT VFR BLD AUTO: 38.7 % (ref 34–46.6)
HCT VFR BLD AUTO: 42.7 % (ref 34–46.6)
HGB BLD-MCNC: 12.3 G/DL (ref 12–15.9)
HGB BLD-MCNC: 13.6 G/DL (ref 12–15.9)
IMM GRANULOCYTES # BLD AUTO: 0.02 10*3/MM3 (ref 0–0.05)
IMM GRANULOCYTES NFR BLD AUTO: 0.3 % (ref 0–0.5)
KETONES UR QL: NEGATIVE
LEUKOCYTE EST, POC: NEGATIVE
LYMPHOCYTES # BLD AUTO: 1.97 10*3/MM3 (ref 0.7–3.1)
LYMPHOCYTES NFR BLD AUTO: 29.1 % (ref 19.6–45.3)
MCH RBC QN AUTO: 28.1 PG (ref 26.6–33)
MCH RBC QN AUTO: 28.1 PG (ref 26.6–33)
MCHC RBC AUTO-ENTMCNC: 31.8 G/DL (ref 31.5–35.7)
MCHC RBC AUTO-ENTMCNC: 31.9 G/DL (ref 31.5–35.7)
MCV RBC AUTO: 88.2 FL (ref 79–97)
MCV RBC AUTO: 88.4 FL (ref 79–97)
METHADONE UR QL SCN: NEGATIVE
MODALITY: ABNORMAL
MODALITY: NORMAL
MONOCYTES # BLD AUTO: 0.58 10*3/MM3 (ref 0.1–0.9)
MONOCYTES NFR BLD AUTO: 8.6 % (ref 5–12)
NEUTROPHILS NFR BLD AUTO: 4.15 10*3/MM3 (ref 1.7–7)
NEUTROPHILS NFR BLD AUTO: 61.1 % (ref 42.7–76)
NITRITE UR-MCNC: NEGATIVE MG/ML
NRBC BLD AUTO-RTO: 0 /100 WBC (ref 0–0.2)
OPIATES UR QL: NEGATIVE
OXYCODONE UR QL SCN: NEGATIVE
PCO2 BLDCOA: 46.9 MMHG (ref 33–49)
PCO2 BLDCOV: 35 MM HG (ref 35–51.3)
PCP UR QL SCN: NEGATIVE
PH BLDCOA: 7.27 PH UNITS (ref 7.18–7.34)
PH BLDCOV: 7.38 PH UNITS (ref 7.26–7.4)
PH UR: 7 [PH] (ref 5–8)
PLATELET # BLD AUTO: 244 10*3/MM3 (ref 140–450)
PLATELET # BLD AUTO: 249 10*3/MM3 (ref 140–450)
PMV BLD AUTO: 11 FL (ref 6–12)
PMV BLD AUTO: 11.2 FL (ref 6–12)
PO2 BLDCOA: 26.1 MMHG
PO2 BLDCOV: 38.3 MM HG (ref 19–39)
POTASSIUM SERPL-SCNC: 3.6 MMOL/L (ref 3.5–5.2)
PROT ?TM UR-MCNC: 9.6 MG/DL
PROT SERPL-MCNC: 6.5 G/DL (ref 6–8.5)
PROT UR STRIP-MCNC: NEGATIVE MG/DL
PROT UR STRIP-MCNC: NEGATIVE MG/DL
PROT/CREAT UR: 0.18 MG/G{CREAT}
RBC # BLD AUTO: 4.38 10*6/MM3 (ref 3.77–5.28)
RBC # BLD AUTO: 4.84 10*6/MM3 (ref 3.77–5.28)
RBC # UR STRIP: NEGATIVE /UL
RH BLD: POSITIVE
SAO2 % BLDCOA: 39.6 %
SAO2 % BLDCOV: 71.7 %
SODIUM SERPL-SCNC: 133 MMOL/L (ref 136–145)
SP GR UR: 1.01 (ref 1–1.03)
T&S EXPIRATION DATE: NORMAL
TREPONEMA PALLIDUM IGG+IGM AB [PRESENCE] IN SERUM OR PLASMA BY IMMUNOASSAY: NORMAL
TRICYCLICS UR QL SCN: NEGATIVE
UROBILINOGEN UR QL: NORMAL
WBC NRBC COR # BLD AUTO: 6.78 10*3/MM3 (ref 3.4–10.8)
WBC NRBC COR # BLD AUTO: 7.78 10*3/MM3 (ref 3.4–10.8)

## 2025-03-03 PROCEDURE — 85025 COMPLETE CBC W/AUTO DIFF WBC: CPT | Performed by: OBSTETRICS & GYNECOLOGY

## 2025-03-03 PROCEDURE — 59025 FETAL NON-STRESS TEST: CPT

## 2025-03-03 PROCEDURE — 59410 OBSTETRICAL CARE: CPT | Performed by: OBSTETRICS & GYNECOLOGY

## 2025-03-03 PROCEDURE — S0260 H&P FOR SURGERY: HCPCS | Performed by: OBSTETRICS & GYNECOLOGY

## 2025-03-03 PROCEDURE — 84112 EVAL AMNIOTIC FLUID PROTEIN: CPT | Performed by: OBSTETRICS & GYNECOLOGY

## 2025-03-03 PROCEDURE — 84156 ASSAY OF PROTEIN URINE: CPT | Performed by: OBSTETRICS & GYNECOLOGY

## 2025-03-03 PROCEDURE — 80307 DRUG TEST PRSMV CHEM ANLYZR: CPT | Performed by: OBSTETRICS & GYNECOLOGY

## 2025-03-03 PROCEDURE — 25010000002 ROPIVACAINE PER 1 MG: Performed by: NURSE ANESTHETIST, CERTIFIED REGISTERED

## 2025-03-03 PROCEDURE — 86901 BLOOD TYPING SEROLOGIC RH(D): CPT | Performed by: OBSTETRICS & GYNECOLOGY

## 2025-03-03 PROCEDURE — 25810000003 SODIUM CHLORIDE 0.9 % SOLUTION: Performed by: OBSTETRICS & GYNECOLOGY

## 2025-03-03 PROCEDURE — 25810000003 LACTATED RINGERS SOLUTION: Performed by: OBSTETRICS & GYNECOLOGY

## 2025-03-03 PROCEDURE — 82570 ASSAY OF URINE CREATININE: CPT | Performed by: OBSTETRICS & GYNECOLOGY

## 2025-03-03 PROCEDURE — 25810000003 LACTATED RINGERS PER 1000 ML: Performed by: OBSTETRICS & GYNECOLOGY

## 2025-03-03 PROCEDURE — 80053 COMPREHEN METABOLIC PANEL: CPT | Performed by: OBSTETRICS & GYNECOLOGY

## 2025-03-03 PROCEDURE — 86780 TREPONEMA PALLIDUM: CPT | Performed by: OBSTETRICS & GYNECOLOGY

## 2025-03-03 PROCEDURE — 85027 COMPLETE CBC AUTOMATED: CPT | Performed by: OBSTETRICS & GYNECOLOGY

## 2025-03-03 PROCEDURE — 25010000002 OXYTOCIN PER 10 UNITS: Performed by: OBSTETRICS & GYNECOLOGY

## 2025-03-03 PROCEDURE — 86900 BLOOD TYPING SEROLOGIC ABO: CPT | Performed by: OBSTETRICS & GYNECOLOGY

## 2025-03-03 PROCEDURE — 25010000002 OXYTOCIN PER 10 UNITS

## 2025-03-03 PROCEDURE — 81002 URINALYSIS NONAUTO W/O SCOPE: CPT | Performed by: OBSTETRICS & GYNECOLOGY

## 2025-03-03 PROCEDURE — G0463 HOSPITAL OUTPT CLINIC VISIT: HCPCS

## 2025-03-03 PROCEDURE — 82803 BLOOD GASES ANY COMBINATION: CPT | Performed by: OBSTETRICS & GYNECOLOGY

## 2025-03-03 PROCEDURE — C1755 CATHETER, INTRASPINAL: HCPCS | Performed by: NURSE ANESTHETIST, CERTIFIED REGISTERED

## 2025-03-03 PROCEDURE — 25810000003 SODIUM CHLORIDE 0.9 % SOLUTION

## 2025-03-03 PROCEDURE — 86850 RBC ANTIBODY SCREEN: CPT | Performed by: OBSTETRICS & GYNECOLOGY

## 2025-03-03 RX ORDER — ACETAMINOPHEN 325 MG/1
650 TABLET ORAL EVERY 6 HOURS PRN
Status: DISCONTINUED | OUTPATIENT
Start: 2025-03-03 | End: 2025-03-05 | Stop reason: HOSPADM

## 2025-03-03 RX ORDER — MISOPROSTOL 200 UG/1
TABLET ORAL
Status: COMPLETED
Start: 2025-03-03 | End: 2025-03-03

## 2025-03-03 RX ORDER — FAMOTIDINE 10 MG/ML
20 INJECTION, SOLUTION INTRAVENOUS ONCE AS NEEDED
Status: DISCONTINUED | OUTPATIENT
Start: 2025-03-03 | End: 2025-03-03 | Stop reason: HOSPADM

## 2025-03-03 RX ORDER — LIDOCAINE HYDROCHLORIDE AND EPINEPHRINE 15; 5 MG/ML; UG/ML
INJECTION, SOLUTION EPIDURAL
Status: COMPLETED | OUTPATIENT
Start: 2025-03-03 | End: 2025-03-03

## 2025-03-03 RX ORDER — HYDROCODONE BITARTRATE AND ACETAMINOPHEN 5; 325 MG/1; MG/1
1 TABLET ORAL EVERY 4 HOURS PRN
Status: DISCONTINUED | OUTPATIENT
Start: 2025-03-03 | End: 2025-03-05 | Stop reason: HOSPADM

## 2025-03-03 RX ORDER — MAGNESIUM CARB/ALUMINUM HYDROX 105-160MG
30 TABLET,CHEWABLE ORAL ONCE
Status: DISCONTINUED | OUTPATIENT
Start: 2025-03-03 | End: 2025-03-03 | Stop reason: HOSPADM

## 2025-03-03 RX ORDER — ONDANSETRON 4 MG/1
4 TABLET, ORALLY DISINTEGRATING ORAL EVERY 6 HOURS PRN
Status: DISCONTINUED | OUTPATIENT
Start: 2025-03-03 | End: 2025-03-05 | Stop reason: HOSPADM

## 2025-03-03 RX ORDER — SODIUM CHLORIDE 9 MG/ML
40 INJECTION, SOLUTION INTRAVENOUS AS NEEDED
Status: DISCONTINUED | OUTPATIENT
Start: 2025-03-03 | End: 2025-03-03 | Stop reason: HOSPADM

## 2025-03-03 RX ORDER — ONDANSETRON 2 MG/ML
4 INJECTION INTRAMUSCULAR; INTRAVENOUS EVERY 6 HOURS PRN
Status: DISCONTINUED | OUTPATIENT
Start: 2025-03-03 | End: 2025-03-05 | Stop reason: HOSPADM

## 2025-03-03 RX ORDER — OXYTOCIN/0.9 % SODIUM CHLORIDE 30/500 ML
250 PLASTIC BAG, INJECTION (ML) INTRAVENOUS CONTINUOUS
Status: DISPENSED | OUTPATIENT
Start: 2025-03-03 | End: 2025-03-03

## 2025-03-03 RX ORDER — IBUPROFEN 600 MG/1
600 TABLET, FILM COATED ORAL EVERY 6 HOURS PRN
Status: DISCONTINUED | OUTPATIENT
Start: 2025-03-03 | End: 2025-03-05 | Stop reason: HOSPADM

## 2025-03-03 RX ORDER — HYDROCODONE BITARTRATE AND ACETAMINOPHEN 10; 325 MG/1; MG/1
1 TABLET ORAL EVERY 4 HOURS PRN
Status: DISCONTINUED | OUTPATIENT
Start: 2025-03-03 | End: 2025-03-05 | Stop reason: HOSPADM

## 2025-03-03 RX ORDER — OXYTOCIN/0.9 % SODIUM CHLORIDE 30/500 ML
125 PLASTIC BAG, INJECTION (ML) INTRAVENOUS ONCE AS NEEDED
Status: COMPLETED | OUTPATIENT
Start: 2025-03-03 | End: 2025-03-03

## 2025-03-03 RX ORDER — OXYTOCIN/0.9 % SODIUM CHLORIDE 30/500 ML
PLASTIC BAG, INJECTION (ML) INTRAVENOUS
Status: COMPLETED
Start: 2025-03-03 | End: 2025-03-03

## 2025-03-03 RX ORDER — BUTORPHANOL TARTRATE 2 MG/ML
1 INJECTION, SOLUTION INTRAMUSCULAR; INTRAVENOUS
Status: DISCONTINUED | OUTPATIENT
Start: 2025-03-03 | End: 2025-03-03 | Stop reason: HOSPADM

## 2025-03-03 RX ORDER — MISOPROSTOL 200 UG/1
600 TABLET ORAL ONCE
Status: COMPLETED | OUTPATIENT
Start: 2025-03-03 | End: 2025-03-03

## 2025-03-03 RX ORDER — FAMOTIDINE 20 MG/1
20 TABLET, FILM COATED ORAL ONCE AS NEEDED
Status: DISCONTINUED | OUTPATIENT
Start: 2025-03-03 | End: 2025-03-03 | Stop reason: HOSPADM

## 2025-03-03 RX ORDER — LIDOCAINE HYDROCHLORIDE 10 MG/ML
0.5 INJECTION, SOLUTION EPIDURAL; INFILTRATION; INTRACAUDAL; PERINEURAL ONCE AS NEEDED
Status: DISCONTINUED | OUTPATIENT
Start: 2025-03-03 | End: 2025-03-03 | Stop reason: HOSPADM

## 2025-03-03 RX ORDER — ACETAMINOPHEN 325 MG/1
650 TABLET ORAL EVERY 4 HOURS PRN
Status: DISCONTINUED | OUTPATIENT
Start: 2025-03-03 | End: 2025-03-03 | Stop reason: HOSPADM

## 2025-03-03 RX ORDER — ONDANSETRON 2 MG/ML
4 INJECTION INTRAMUSCULAR; INTRAVENOUS EVERY 6 HOURS PRN
Status: DISCONTINUED | OUTPATIENT
Start: 2025-03-03 | End: 2025-03-03 | Stop reason: HOSPADM

## 2025-03-03 RX ORDER — CALCIUM CARBONATE 500 MG/1
2 TABLET, CHEWABLE ORAL 3 TIMES DAILY PRN
Status: DISCONTINUED | OUTPATIENT
Start: 2025-03-03 | End: 2025-03-05 | Stop reason: HOSPADM

## 2025-03-03 RX ORDER — IBUPROFEN 600 MG/1
600 TABLET, FILM COATED ORAL EVERY 6 HOURS
Status: DISCONTINUED | OUTPATIENT
Start: 2025-03-03 | End: 2025-03-03 | Stop reason: HOSPADM

## 2025-03-03 RX ORDER — FENTANYL/ROPIVACAINE/NS/PF 2MCG/ML-.2
PLASTIC BAG, INJECTION (ML) INJECTION
Status: COMPLETED
Start: 2025-03-03 | End: 2025-03-03

## 2025-03-03 RX ORDER — SODIUM CHLORIDE 0.9 % (FLUSH) 0.9 %
10 SYRINGE (ML) INJECTION AS NEEDED
Status: DISCONTINUED | OUTPATIENT
Start: 2025-03-03 | End: 2025-03-03 | Stop reason: HOSPADM

## 2025-03-03 RX ORDER — ONDANSETRON 4 MG/1
4 TABLET, ORALLY DISINTEGRATING ORAL EVERY 6 HOURS PRN
Status: DISCONTINUED | OUTPATIENT
Start: 2025-03-03 | End: 2025-03-03 | Stop reason: HOSPADM

## 2025-03-03 RX ORDER — OXYTOCIN/0.9 % SODIUM CHLORIDE 30/500 ML
999 PLASTIC BAG, INJECTION (ML) INTRAVENOUS ONCE
Status: COMPLETED | OUTPATIENT
Start: 2025-03-03 | End: 2025-03-03

## 2025-03-03 RX ORDER — SODIUM CHLORIDE, SODIUM LACTATE, POTASSIUM CHLORIDE, CALCIUM CHLORIDE 600; 310; 30; 20 MG/100ML; MG/100ML; MG/100ML; MG/100ML
125 INJECTION, SOLUTION INTRAVENOUS CONTINUOUS
Status: DISCONTINUED | OUTPATIENT
Start: 2025-03-03 | End: 2025-03-03

## 2025-03-03 RX ORDER — ROPIVACAINE HYDROCHLORIDE 2 MG/ML
INJECTION, SOLUTION EPIDURAL; INFILTRATION; PERINEURAL
Status: COMPLETED
Start: 2025-03-03 | End: 2025-03-03

## 2025-03-03 RX ORDER — DOCUSATE SODIUM 100 MG/1
100 CAPSULE, LIQUID FILLED ORAL 2 TIMES DAILY
Status: DISCONTINUED | OUTPATIENT
Start: 2025-03-03 | End: 2025-03-05 | Stop reason: HOSPADM

## 2025-03-03 RX ORDER — SODIUM CHLORIDE 0.9 % (FLUSH) 0.9 %
10 SYRINGE (ML) INJECTION EVERY 12 HOURS SCHEDULED
Status: DISCONTINUED | OUTPATIENT
Start: 2025-03-03 | End: 2025-03-03 | Stop reason: HOSPADM

## 2025-03-03 RX ORDER — SODIUM CHLORIDE 0.9 % (FLUSH) 0.9 %
1-10 SYRINGE (ML) INJECTION AS NEEDED
Status: DISCONTINUED | OUTPATIENT
Start: 2025-03-03 | End: 2025-03-05 | Stop reason: HOSPADM

## 2025-03-03 RX ORDER — EPHEDRINE SULFATE 50 MG/ML
5 INJECTION, SOLUTION INTRAVENOUS
Status: DISCONTINUED | OUTPATIENT
Start: 2025-03-03 | End: 2025-03-03 | Stop reason: HOSPADM

## 2025-03-03 RX ORDER — TERBUTALINE SULFATE 1 MG/ML
0.25 INJECTION SUBCUTANEOUS AS NEEDED
Status: DISCONTINUED | OUTPATIENT
Start: 2025-03-03 | End: 2025-03-03 | Stop reason: HOSPADM

## 2025-03-03 RX ORDER — ROPIVACAINE HYDROCHLORIDE 2 MG/ML
INJECTION, SOLUTION EPIDURAL; INFILTRATION; PERINEURAL
Status: COMPLETED | OUTPATIENT
Start: 2025-03-03 | End: 2025-03-03

## 2025-03-03 RX ORDER — ACETAMINOPHEN 325 MG/1
650 TABLET ORAL EVERY 6 HOURS
Status: DISCONTINUED | OUTPATIENT
Start: 2025-03-03 | End: 2025-03-03 | Stop reason: HOSPADM

## 2025-03-03 RX ADMIN — Medication 10 ML/HR: at 17:38

## 2025-03-03 RX ADMIN — Medication 999 MILLI-UNITS/MIN: at 18:22

## 2025-03-03 RX ADMIN — SODIUM CHLORIDE 125 ML/HR: 9 INJECTION, SOLUTION INTRAVENOUS at 21:41

## 2025-03-03 RX ADMIN — MISOPROSTOL 800 MCG: 200 TABLET ORAL at 18:25

## 2025-03-03 RX ADMIN — ROPIVACAINE HYDROCHLORIDE 7 ML: 2 INJECTION, SOLUTION EPIDURAL; INFILTRATION; PERINEURAL at 17:35

## 2025-03-03 RX ADMIN — SODIUM CHLORIDE, SODIUM LACTATE, POTASSIUM CHLORIDE, CALCIUM CHLORIDE 1000 ML: 20; 30; 600; 310 INJECTION, SOLUTION INTRAVENOUS at 16:48

## 2025-03-03 RX ADMIN — IBUPROFEN 600 MG: 600 TABLET, FILM COATED ORAL at 20:08

## 2025-03-03 RX ADMIN — LIDOCAINE HYDROCHLORIDE AND EPINEPHRINE 2 ML: 15; 5 INJECTION, SOLUTION EPIDURAL at 17:32

## 2025-03-03 RX ADMIN — SODIUM CHLORIDE, SODIUM LACTATE, POTASSIUM CHLORIDE, CALCIUM CHLORIDE 125 ML/HR: 20; 30; 600; 310 INJECTION, SOLUTION INTRAVENOUS at 17:52

## 2025-03-03 NOTE — NURSING NOTE
"Patient's SVE remains 4-5/80/-1, continues to have irregular contractions q 3-6 mins.  Patient reports cramping and \"feeling it in my back.\"  Patient off monitors to ambulate in hallway x 1 hour, will eat breakfast and recheck per Dr. Ron order.  "

## 2025-03-03 NOTE — L&D DELIVERY NOTE
" Tracy  Vaginal Delivery Note    Delivery     Delivery:    YOB: 2027   Time of Birth:  Gestational Age 6:18 PM  37w6d     Anesthesia: Epidural   Delivering clinician: Cale Del Rio MD   Forceps?   No   Vacuum? No    Shoulder dystocia present: No        Delivery narrative: Viable male infant, weight and Apgars have not been recorded as of yet, was delivered over intact perineum.  Infant's nose and mouth were bulb suction.  Cord was clamped and cut after 30 seconds.  Infant was placed on mom's chest.  Cord gases and cord blood within obtained.  Placenta was delivered via gentle traction.  Inspection revealed some perineal abrasions requiring no repair.  Bimanual exam was performed and uterus was noted to be firm and bleeding was minimal    Infant    Findings: child infant     Infant observations: Weight: No birth weight on file.  Length:   in  Observations/Comments:        Apgars:   @ 1 minute /      @ 5 minutes         Placenta, Cord, and Fluid    Placenta delivered  Spontaneous at   3/3/2025  6:21 PM    Cord: 3 vessels present.   Nuchal Cord?  no   Cord blood obtained: Yes   Cord gases obtained:  Yes   Cord gas results: Venous:  No results found for: \"PHCVEN\", \"BECVEN\"    Arterial:  No results found for: \"PHCART\", \"BECART\"     Repair    Episiotomy: Not recorded    No    Lacerations: Perineal abrasions not repaired   Estimated Blood Loss: 150 cc           Complications  none    Disposition  Mother to Mother Baby/Postpartum  in stable condition currently.  Baby to remains with mom  in stable condition currently.      Cale Del Rio MD  25  18:31 EST      "

## 2025-03-03 NOTE — H&P
TEHO Tracy  Obstetric History and Physical    Chief Complaint   Patient presents with    Laboring     Sent from office for augmentation       Subjective     Patient is a 24 y.o. female  currently at 37w6d, who was seen in office today for regular OB visit.  She was noted to have elevated blood pressures in the 140s to 150s over 90s.  She denied headache, visual disturbance, right upper quadrant pain.  She was seen on labor and delivery early this morning and was also noted to have elevated blood pressures.  Preeclampsia labs were done and were negative.  Patient was noted to be dilated 4 to 5 cm and was observed for 3 hours with no further cervical change.    [place] by:  [facility] [place]. Her prenatal care is complicated by gestational diabetes diet-controlled.  Her previous obstetric/gynecological history is noted for is non-contributory.    The following portions of the patients history were reviewed and updated as appropriate: current medications, allergies, past medical history, past surgical history, past family history, past social history, and problem list .       Prenatal Information:  Prenatal Results       Initial Prenatal Labs       Test Value Reference Range Date Time    Hemoglobin  13.8 g/dL 12.0 - 15.9 10/11/24 1911       14.7 g/dL 12.0 - 15.9 24 1249    Hematocrit  41.0 % 34.0 - 46.6 10/11/24 1911       43.9 % 34.0 - 46.6 24 1249    Platelets  239 10*3/mm3 140 - 450 10/11/24 1911       218 10*3/mm3 140 - 450 24 1249    Rubella IgG  2.10 index Immune >0.99 24 1249    Hepatitis B SAg  Non-Reactive  Non-Reactive 24 1249    Hepatitis C Ab  Non-Reactive  Non-Reactive 24 1249    RPR  Non Reactive  Non Reactive 24 1407       Non-Reactive  Non-Reactive 24 1249    T. Pallidum Ab         ABO  O   24 1249    Rh  Positive   24 1249    Antibody Screen  Negative   24 1249    HIV  Non-Reactive  Non-Reactive 24 1249    Urine Culture  No growth    12/31/24 1252    Gonorrhea  Negative  Negative 02/12/25 1631       Negative  Negative 08/07/24 1603    Chlamydia  Negative  Negative 02/12/25 1631       Negative  Negative 08/07/24 1603    TSH        HgB A1c         Varicella IgG        Hemoglobinopathy Fractionation        Hemoglobinopathy (genetic testing)        Cystic fibrosis         Spinal muscular atrophy        Fragile X                  Fetal testing        Test Value Reference Range Date Time    NIPT        MSAFP        AFP-4                  2nd and 3rd Trimester       Test Value Reference Range Date Time    Hemoglobin (repeated)  13.6 g/dL 12.0 - 15.9 03/03/25 1632       12.3 g/dL 12.0 - 15.9 03/03/25 0606       11.4 g/dL 12.0 - 15.9 01/29/25 0959       11.6 g/dL 12.0 - 15.9 12/27/24 1407    Hematocrit (repeated)  42.7 % 34.0 - 46.6 03/03/25 1632       38.7 % 34.0 - 46.6 03/03/25 0606       35.1 % 34.0 - 46.6 01/29/25 0959       34.8 % 34.0 - 46.6 12/27/24 1407    Platelets   249 10*3/mm3 140 - 450 03/03/25 1632       244 10*3/mm3 140 - 450 03/03/25 0606       261 10*3/mm3 140 - 450 01/29/25 0959       214 10*3/mm3 140 - 450 12/27/24 1407       239 10*3/mm3 140 - 450 10/11/24 1911       218 10*3/mm3 140 - 450 08/08/24 1249    1 hour GTT   145 mg/dL 70 - 139 12/27/24 1407    Antibody Screen (repeated)        3rd TM syphilis scrn (repeated)  RPR   Non Reactive  Non Reactive 12/27/24 1407    3rd TM syphilis scrn (repeated) TP-Ab        3rd TM syphilis screen TB-Ab (FTA)        Syphilis cascade test TP-Ab (EIA)        Syphilis cascade TPPA        GTT Fasting  76 mg/dL 65 - 94 01/14/25 0949    GTT 1 Hr  172 mg/dL 65 - 179 01/14/25 1106    GTT 2 Hr  178 mg/dL 65 - 154 01/14/25 1200    GTT 3 Hr  173 mg/dL 65 - 139 01/14/25 1257    Group B Strep  Negative  Negative 02/12/25 1628              Other testing        Test Value Reference Range Date Time    Parvo IgG         CMV IgG                   Drug Screening       Test Value Reference Range Date Time     Amphetamine Screen        Barbiturate Screen        Benzodiazepine Screen        Methadone Screen        Phencyclidine Screen        Opiates Screen        THC Screen        Cocaine Screen        Propoxyphene Screen        Buprenorphine Screen        Methamphetamine Screen        Oxycodone Screen        Tricyclic Antidepressants Screen                  Legend    ^: Historical                          External Prenatal Results       Pregnancy Outside Results - Transcribed From Office Records - See Scanned Records For Details       Test Value Date Time    ABO  O  08/08/24 1249    Rh  Positive  08/08/24 1249    Antibody Screen  Negative  08/08/24 1249    Varicella IgG       Rubella  2.10 index 08/08/24 1249    Hgb  13.6 g/dL 03/03/25 1632       12.3 g/dL 03/03/25 0606       11.4 g/dL 01/29/25 0959       11.6 g/dL 12/27/24 1407       13.8 g/dL 10/11/24 1911       14.7 g/dL 08/08/24 1249    Hct  42.7 % 03/03/25 1632       38.7 % 03/03/25 0606       35.1 % 01/29/25 0959       34.8 % 12/27/24 1407       41.0 % 10/11/24 1911       43.9 % 08/08/24 1249    HgB A1c        1h GTT  145 mg/dL 12/27/24 1407    3h GTT Fasting  76 mg/dL 01/14/25 0949    3h GTT 1 hour  172 mg/dL 01/14/25 1106    3h GTT 2 hour  178 mg/dL 01/14/25 1200    3h GTT 3 hour   173 mg/dL 01/14/25 1257    Gonorrhea (discrete)  Negative  02/12/25 1631       Negative  08/07/24 1603    Chlamydia (discrete)  Negative  02/12/25 1631       Negative  08/07/24 1603    RPR  Non Reactive  12/27/24 1407       Non-Reactive  08/08/24 1249    Syphils cascade: TP-Ab (FTA)       TP-Ab       TP-Ab (EIA)       TPPA       HBsAg  Non-Reactive  08/08/24 1249    Herpes Simplex Virus PCR       Herpes Simplex VIrus Culture       HIV  Non-Reactive  08/08/24 1249    Hep C RNA Quant PCR       Hep C Antibody  Non-Reactive  08/08/24 1249    AFP       NIPT       Cystic Fibrosis (Tian)       Cystic Fibroisis        Spinal Muscular atrophy       Fragile X       Group B Strep  Negative   25 1628    GBS Susceptibility to Clindamycin       GBS Susceptibility to Erythromycin       Fetal Fibronectin       Genetic Testing, Maternal Blood                 Drug Screening       Test Value Date Time    Urine Drug Screen       Amphetamine Screen       Barbiturate Screen       Benzodiazepine Screen       Methadone Screen       Phencyclidine Screen       Opiates Screen       THC Screen       Cocaine Screen       Propoxyphene Screen       Buprenorphine Screen       Methamphetamine Screen       Oxycodone Screen       Tricyclic Antidepressants Screen                 Legend    ^: Historical                             Past OB History:     OB History    Para Term  AB Living   4 2 2 0 1 2   SAB IAB Ectopic Molar Multiple Live Births   1 0 0 0 0 2      # Outcome Date GA Lbr Chase/2nd Weight Sex Type Anes PTL Lv   4 Current            3 SAB 22 4w0d   U    FD   2 Term 20 38w0d  2722 g (6 lb) F Vag-Spont EPI N THU   1 Term 18 37w0d  2495 g (5 lb 8 oz) F Vag-Spont EPI N THU       Past Medical History: Past Medical History:   Diagnosis Date    Allergy to gluten     Gastritis     GDM (gestational diabetes mellitus)     GDM (gestational diabetes mellitus)     GERD (gastroesophageal reflux disease) 2023    Gestational hypertension 3/3/2025    Raynaud's phenomenon       Past Surgical History Past Surgical History:   Procedure Laterality Date    COLONOSCOPY  2024    COLONOSCOPY N/A 3/26/2024    Procedure: COLONOSCOPY;  Surgeon: Darwin Cid MD;  Location: [facility] ENDOSCOPY;  Service: Gastroenterology;  Laterality: N/A;  NORMAL COLONOSCOPY    ENDOSCOPY  2024    ENDOSCOPY N/A 3/26/2024    Procedure: ESOPHAGOGASTRODUODENOSCOPY WITH BIOPSIES;  Surgeon: Darwin Cid MD;  Location: [facility] ENDOSCOPY;  Service: Gastroenterology;  Laterality: N/A;  NORMAL ENDOSCOPY      Family History: Family History   Problem Relation Age of Onset    Prostate cancer Paternal  Grandfather     Colon cancer Maternal Grandfather     Breast cancer Neg Hx     Ovarian cancer Neg Hx     Uterine cancer Neg Hx       Social History:  reports that she has never smoked. She has never been exposed to tobacco smoke. She has never used smokeless tobacco.   reports no history of alcohol use.   reports no history of drug use.        General ROS: Pertinent items are noted in HPI    Objective       Vital Signs Range for the last 24 hours  Temperature: Temp:  [97.7 °F (36.5 °C)] 97.7 °F (36.5 °C)   Temp Source: Temp src: Oral   BP: BP: (113-151)/(79-98) 142/98   Pulse: Heart Rate:  [74-99] 88   Respirations: Resp:  [18] 18   SPO2: SpO2:  [99 %-100 %] 99 %   O2 Amount (l/min):     O2 Devices     Weight: Weight:  [57.6 kg (127 lb)-57.7 kg (127 lb 3.2 oz)] 57.6 kg (127 lb)     Physical Examination: General appearance - alert, well appearing, and in no distress  Mental status - alert, oriented to person, place, and time  Chest - clear to auscultation, no wheezes, rales or rhonchi, symmetric air entry  Heart - normal rate, regular rhythm, normal S1, S2, no murmurs, rubs, clicks or gallops  Abdomen - soft, nontender, nondistended, no masses or organomegaly  Pelvic - normal external genitalia, vulva, vagina, cervix, uterus and adnexa  Back exam - full range of motion, no tenderness, palpable spasm or pain on motion  Neurological - alert, oriented, normal speech, no focal findings or movement disorder noted  Extremities - peripheral pulses normal, no pedal edema, no clubbing or cyanosis  Skin - normal coloration and turgor, no rashes, no suspicious skin lesions noted    Presentation: Vertex   Cervix: Exam by: Dr. Vegas   Dilation: 6 cm   Effacement: 80 to 90% effacement   Station: 0       Fetal Heart Rate Assessment   Method:     Beats/min:     Baseline:     Variability:     Accels:     Decels:           Uterine Assessment   Method:     Frequency (min):     Ctx Count in 10 min:     Duration:     Intensity:          [place] Units:       GBS is negative      Assessment & Plan       Gestational hypertension  Labor      Assessment:  1.  Intrauterine pregnancy at 37w6d gestation with reactive fetal status.    2.  Gestational hypertension.  Early labor.  3.  Obstetrical history significant for is non-contributory.  4.  GBS status:   Group B Strep, DNA   Date Value Ref Range Status   02/12/2025 Negative Negative Final       Plan:  1. Vaginal anticipated  2. Plan of care has been reviewed with patient and patient agrees.   3.  Risks, benefits of treatment plan have been discussed.  4.  All questions have been answered.  5.  May have epidural      Cale Del Rio MD  3/3/2025  16:53 EST

## 2025-03-03 NOTE — H&P
"THEO Tracy  Obstetric History and Physical    Chief Complaint   Patient presents with    Contractions    Leaking Fluid       Subjective     HPI:    Patient is a 24 y.o. female  currently at 37w6d, who presents with leaking fluid.  Patient reports she got up to use the restroom this morning around 4:30 AM and after she finished urinating, she felt some fluid running down her leg.  Patient is unsure of the fluid color but thinks it was clear.    Patient also reports she has been feeling contractions throughout the night.  Patient states she felt them from 1:30 AM to 3:00 AM, and then she was able to go back to sleep.  Patient states she then woke up again at 4:30 AM with back pain.  Patient reports she is currently feeling contractions every 3 to 4 minutes.    Patient reports good fetal movement and denies vaginal bleeding.    Patient notes feeling slightly nauseated and overall just \"does not feel great\".  Patient denies F/C, denies sore throat, and endorses rhinorrhea.  Patient reports she has had GERD, but she has not taken any medication for it.    Patient reports she has had a mild headache, but she does not currently have a headache.  Patient reports she has experienced some visual scotomata, but she is not experiencing that currently.  Patient reports some right upper quadrant tenderness, but thinks it is related to fetal positioning.  Patient denies epigastric pain.    PNC provided by:  [facility]. Her prenatal care is   Patient Active Problem List   Diagnosis    Lymph node enlargement    Generalized abdominal pain    Gastroesophageal reflux disease without esophagitis    Other dysphagia    Upper abdominal pain    Screening for viral disease    Blood in stool    Rectal bleeding    Dyspepsia    Dysphagia    Globus sensation    Pain of upper abdomen    FH: colon cancer    Poor fetal growth affecting management of mother in third trimester    Abnormal glucose tolerance test    GDM (gestational diabetes mellitus) "    Axillary mass, left    Encounter for preconception consultation    Family planning counseling    History of miscarriage    Raynaud's disease    Leakage of amniotic fluid     .  Her previous obstetric/gynecological history is noted for  2 prior full-term vaginal deliveries and 1 miscarriage .    The following portions of the patients history were reviewed and updated as appropriate:   current medications, allergies, past medical history, past surgical history, past family history, past social history and current problem list.     Prenatal Information:  Prenatal Results       Initial Prenatal Labs       Test Value Reference Range Date Time    Hemoglobin  13.8 g/dL 12.0 - 15.9 10/11/24 1911       14.7 g/dL 12.0 - 15.9 08/08/24 1249    Hematocrit  41.0 % 34.0 - 46.6 10/11/24 1911       43.9 % 34.0 - 46.6 08/08/24 1249    Platelets  239 10*3/mm3 140 - 450 10/11/24 1911       218 10*3/mm3 140 - 450 08/08/24 1249    Rubella IgG  2.10 index Immune >0.99 08/08/24 1249    Hepatitis B SAg  Non-Reactive  Non-Reactive 08/08/24 1249    Hepatitis C Ab  Non-Reactive  Non-Reactive 08/08/24 1249    RPR  Non Reactive  Non Reactive 12/27/24 1407       Non-Reactive  Non-Reactive 08/08/24 1249    T. Pallidum Ab         ABO  O   08/08/24 1249    Rh  Positive   08/08/24 1249    Antibody Screen  Negative   08/08/24 1249    HIV  Non-Reactive  Non-Reactive 08/08/24 1249    Urine Culture  No growth   12/31/24 1252    Gonorrhea  Negative  Negative 02/12/25 1631       Negative  Negative 08/07/24 1603    Chlamydia  Negative  Negative 02/12/25 1631       Negative  Negative 08/07/24 1603    TSH        HgB A1c         Varicella IgG        Hemoglobinopathy Fractionation        Hemoglobinopathy (genetic testing)        Cystic fibrosis         Spinal muscular atrophy        Fragile X                  Fetal testing        Test Value Reference Range Date Time    NIPT        MSAFP        AFP-4                  2nd and 3rd Trimester       Test Value  Reference Range Date Time    Hemoglobin (repeated)  11.4 g/dL 12.0 - 15.9 01/29/25 0959       11.6 g/dL 12.0 - 15.9 12/27/24 1407    Hematocrit (repeated)  35.1 % 34.0 - 46.6 01/29/25 0959       34.8 % 34.0 - 46.6 12/27/24 1407    Platelets   261 10*3/mm3 140 - 450 01/29/25 0959       214 10*3/mm3 140 - 450 12/27/24 1407       239 10*3/mm3 140 - 450 10/11/24 1911       218 10*3/mm3 140 - 450 08/08/24 1249    1 hour GTT   145 mg/dL 70 - 139 12/27/24 1407    Antibody Screen (repeated)        3rd TM syphilis scrn (repeated)  RPR   Non Reactive  Non Reactive 12/27/24 1407    3rd TM syphilis scrn (repeated) TP-Ab        3rd TM syphilis screen TB-Ab (FTA)        Syphilis cascade test TP-Ab (EIA)        Syphilis cascade TPPA        GTT Fasting  76 mg/dL 65 - 94 01/14/25 0949    GTT 1 Hr  172 mg/dL 65 - 179 01/14/25 1106    GTT 2 Hr  178 mg/dL 65 - 154 01/14/25 1200    GTT 3 Hr  173 mg/dL 65 - 139 01/14/25 1257    Group B Strep  Negative  Negative 02/12/25 1628              Other testing        Test Value Reference Range Date Time    Parvo IgG         CMV IgG                   Drug Screening       Test Value Reference Range Date Time    Amphetamine Screen        Barbiturate Screen        Benzodiazepine Screen        Methadone Screen        Phencyclidine Screen        Opiates Screen        THC Screen        Cocaine Screen        Propoxyphene Screen        Buprenorphine Screen        Methamphetamine Screen        Oxycodone Screen        Tricyclic Antidepressants Screen                  Legend    ^: Historical                          External Prenatal Results       Pregnancy Outside Results - Transcribed From Office Records - See Scanned Records For Details       Test Value Date Time    ABO  O  08/08/24 1249    Rh  Positive  08/08/24 1249    Antibody Screen  Negative  08/08/24 1249    Varicella IgG       Rubella  2.10 index 08/08/24 1249    Hgb  11.4 g/dL 01/29/25 0959       11.6 g/dL 12/27/24 1407       13.8 g/dL 10/11/24  1911       14.7 g/dL 24 1249    Hct  35.1 % 25 0959       34.8 % 24 1407       41.0 % 10/11/24 1911       43.9 % 24 1249    HgB A1c        1h GTT  145 mg/dL 24 1407    3h GTT Fasting  76 mg/dL 25 0949    3h GTT 1 hour  172 mg/dL 25 1106    3h GTT 2 hour  178 mg/dL 25 1200    3h GTT 3 hour   173 mg/dL 25 1257    Gonorrhea (discrete)  Negative  25 1631       Negative  24 1603    Chlamydia (discrete)  Negative  25 1631       Negative  24 1603    RPR  Non Reactive  24 1407       Non-Reactive  24 1249    Syphils cascade: TP-Ab (FTA)       TP-Ab       TP-Ab (EIA)       TPPA       HBsAg  Non-Reactive  24 1249    Herpes Simplex Virus PCR       Herpes Simplex VIrus Culture       HIV  Non-Reactive  24 1249    Hep C RNA Quant PCR       Hep C Antibody  Non-Reactive  24 1249    AFP       NIPT       Cystic Fibrosis (Tian)       Cystic Fibroisis        Spinal Muscular atrophy       Fragile X       Group B Strep  Negative  25 1628    GBS Susceptibility to Clindamycin       GBS Susceptibility to Erythromycin       Fetal Fibronectin       Genetic Testing, Maternal Blood                 Drug Screening       Test Value Date Time    Urine Drug Screen       Amphetamine Screen       Barbiturate Screen       Benzodiazepine Screen       Methadone Screen       Phencyclidine Screen       Opiates Screen       THC Screen       Cocaine Screen       Propoxyphene Screen       Buprenorphine Screen       Methamphetamine Screen       Oxycodone Screen       Tricyclic Antidepressants Screen                 Legend    ^: Historical                             Past OB History:     OB History    Para Term  AB Living   4 2 2 0 1 2   SAB IAB Ectopic Molar Multiple Live Births   1 0 0 0 0 2      # Outcome Date GA Lbr Chase/2nd Weight Sex Type Anes PTL Lv   4 Current            3 SAB 22 4w0d   U    FD   2 Term 20 38w0d   2722 g (6 lb) F Vag-Spont EPI N THU   1 Term 08/05/18 37w0d  2495 g (5 lb 8 oz) F Vag-Spont EPI N THU      Past Gynecologic History: no STIs, no abnormal paps    Past Medical History: Past Medical History:   Diagnosis Date    Allergy to gluten     Gastritis 2024    GDM (gestational diabetes mellitus)     GDM (gestational diabetes mellitus)     GERD (gastroesophageal reflux disease) Jan 2023    Raynaud's phenomenon       Past Surgical History Past Surgical History:   Procedure Laterality Date    COLONOSCOPY  03/26/2024    COLONOSCOPY N/A 3/26/2024    Procedure: COLONOSCOPY;  Surgeon: Darwin Cid MD;  Location: [facility] ENDOSCOPY;  Service: Gastroenterology;  Laterality: N/A;  NORMAL COLONOSCOPY    ENDOSCOPY  03/26/2024    ENDOSCOPY N/A 3/26/2024    Procedure: ESOPHAGOGASTRODUODENOSCOPY WITH BIOPSIES;  Surgeon: Darwin Cid MD;  Location: [facility] ENDOSCOPY;  Service: Gastroenterology;  Laterality: N/A;  NORMAL ENDOSCOPY      Family History: Family History   Problem Relation Age of Onset    Prostate cancer Paternal Grandfather     Colon cancer Maternal Grandfather     Breast cancer Neg Hx     Ovarian cancer Neg Hx     Uterine cancer Neg Hx       Social History:  reports that she has never smoked. She has never been exposed to tobacco smoke. She has never used smokeless tobacco.   reports no history of alcohol use.   reports no history of drug use.        General ROS: Pertinent items are noted in HPI    Home Medications:  Blood Glucose Test, Lancets 30G, ONE TOUCH ULTRA 2, ferrous sulfate, glucose monitor, and prenatal vitamin    Allergies:  Allergies   Allergen Reactions    Amoxicillin Rash       Objective       Vital Signs Range for the last 24 hours  Temperature: Temp:  [97.7 °F (36.5 °C)] 97.7 °F (36.5 °C)   Temp Source: Temp src: Oral   BP: BP: (113-120)/(79-91) 113/84   Pulse: Heart Rate:  [74-99] 88   Respirations: Resp:  [18] 18   SPO2: SpO2:  [99 %-100 %] 99 %     Physical Examination:    General appearance - alert, well appearing, and in no distress  Mental status - alert, oriented to person, place, and time  Chest - normal respiratory effort  Abdomen - soft, gravid, mild tenderness to palpation in RUQ  Pelvic - normal external genitalia, vulva, vagina, cervix, and uterus   Back exam - full range of motion, no tenderness or pain on motion  Neurological - alert, oriented, normal speech  Extremities - peripheral pulses normal  Skin - normal coloration and turgor, no suspicious skin lesions noted    Presentation: Cephalic per bedside sono performed in triage today   Cervix: Method: sterile vaginal exam performed   Dilation: Cervical Dilation (cm): 4-5   Effacement: Cervical Effacement: 80   Station:         Fetal Heart Rate Assessment   Method: Fetal HR Assessment Method: external   Beats/min: Fetal HR (beats/min): 135 (off monitor for bedside US from [id number])   Baseline: Fetal HR Baseline: normal range   Variability: Fetal HR Variability: moderate (amplitude range 6 to 25 bpm)   Accels: Fetal HR Accelerations: greater than/equal to 15 bpm, lasting at least 15 seconds   Decels: Fetal HR Decelerations: absent   Tracing Category:       Uterine Assessment   Method: Method: external tocotransducer, palpation   Frequency (min): Contraction Frequency (Minutes): 2-7   Ctx Count in 10 min:     Duration:     Intensity: Contraction Intensity: mild by palpation   [place] Units:       GBS is negative     Bedside Sono: DEEJAY 9.1 cm, MVP 4.14 cm, cephalic, fetal lower extremities noted in RUQ    Assessment & Plan       Leakage of amniotic fluid        Assessment:  1.  Intrauterine pregnancy at 37w6d gestation with reactive, reassuring fetal status.    2.  No evidence for spontaneous rupture of membranes  3.  Obstetrical history significant for  2 prior full-term vaginal deliveries and 1 miscarriage .  4.  GBS status:   Group B Strep, DNA   Date Value Ref Range Status   02/12/2025 Negative Negative Final        Plan:  1.  Rule out spontaneous rupture of membranes  - AmniSure negative  - Bedside sono demonstrated DEEJAY 9.1 cm with MVP 4.14 cm  - No evidence for rupture of membranes    2.  Elevated blood pressure reading  - Initial blood pressure reading upon presentation to triage was 120/91  - Patient without signs or symptoms of preeclampsia currently  - Hemoglobin 12.3, platelets 244 K, creatinine 0.73, ALT 16, AST 18, urine P: C = 0.18  - No evidence for preeclampsia at this time    3.  Rule out labor  -Patient previously 3 cm dilated in the office on 2/26/2025  -Patient 4 to 5 cm dilated in triage  -Patient initially with more frequent contractions on tocometer, then contractions seem to have spaced out  -Per nursing staff the contractions palpate mild  -Repeat cervical exam performed blood hour after the initial exam was closer to 5 cm per nursing staff  -Will repeat cervical exam in 1 hour    4.  GDMA1  - Blood glucose 76 on CMP this morning    5.  Nausea  - Offered patient either Zofran and/or famotidine, but patient declines x 2  - Patient ultimately reported she feels better    6.  RUQ pain  - Likely related to fetal positioning as AST and ALT are normal and bedside sono demonstrated fetal extremities in the RUQ    **Of note, patient appears to have allergic reaction to ultrasound gel when used with external monitors.  To perform bedside sono, sterile gel used instead.    Patient reports she has an appointment with Dr. Del Rio later today, at 2 PM.  Will reassess patient in 1 hour to determine a management plan.      Plan of care has been reviewed with patient and patient agrees.   Risks, benefits of treatment plan have been discussed.  All questions have been answered.        Electronically signed by Kym Sanchez MD, 03/03/25, 5:54 AM EST.

## 2025-03-03 NOTE — NURSING NOTE
Patient's SVE remains 4-5/80/-1, patient verbalizes contractions have not increased in intensity or become more regular and is comfortable with d/c home to await labor progression.  Patient discharged home with verbal and written labor precautions and instructions to keep scheduled appointment in office today.  Verbalized understanding.

## 2025-03-03 NOTE — PROGRESS NOTES
Routine Prenatal Visit     Subjective  Taya Leonard is a 24 y.o.  at 37w6d here for her routine OB visit.   She is taking her prenatal vitamins.Reports no loss of fluid or vaginal bleeding. Patient doing well without any complaints except for contractions.  She was seen in the hospital earlier this morning with concerns about leaking fluid.. Pregnancy complicated by:     Patient Active Problem List   Diagnosis    Lymph node enlargement    Generalized abdominal pain    Gastroesophageal reflux disease without esophagitis    Other dysphagia    Upper abdominal pain    Screening for viral disease    Blood in stool    Rectal bleeding    Dyspepsia    Dysphagia    Globus sensation    Pain of upper abdomen    FH: colon cancer    Poor fetal growth affecting management of mother in third trimester    Abnormal glucose tolerance test    GDM (gestational diabetes mellitus)    Axillary mass, left    Encounter for preconception consultation    Family planning counseling    History of miscarriage    Raynaud's disease    Leakage of amniotic fluid         OB History    Para Term  AB Living   4 2 2   1 2   SAB IAB Ectopic Molar Multiple Live Births   1         2      # Outcome Date GA Lbr Chase/2nd Weight Sex Type Anes PTL Lv   4 Current            3 SAB 22 4w0d   U    FD   2 Term 20 38w0d  2722 g (6 lb) F Vag-Spont EPI N THU   1 Term 18 37w0d  2495 g (5 lb 8 oz) F Vag-Spont EPI N THU           ROS:   General ROS: negative for - chills or fatigue  Genito-Urinary ROS: negative for  change in urinary stream, vaginal discharge     Objective  Physical Exam:   Vitals:    25 1423   BP: 142/98       Uterine Size: size less than dates  FHT: 110-160 BPM    General appearance - alert, well appearing, and in no distress  Abdomen- Soft, Gravid uterus, non-tender to palpation  Extremeties: negative swelling   Cervix is 5 cm / 75% effaced/0 station    Assessment/Plan:   Assessment & Plan  37 weeks  gestation of pregnancy    Orders:    POC Urinalysis Dipstick    High-risk pregnancy in third trimester  Patient noted to have elevated blood pressures in office today.  She had elevated blood pressures earlier this morning when she was seen in the hospital.  By definition she has gestational hypertension.       Diet controlled gestational diabetes mellitus (GDM) in third trimester           Gestational hypertension, third trimester  Placenta the hospital for labor augmentation as she is already 5 cm dilated.           Counseling:     Round Ligament Pain:  The uterus has several ligaments which provide support and keep the uterus in place. As the  uterus grows these ligaments are pulled and stretched which often causes sharp stabbing like pain in the inguinal area.   You may find a pregnancy support band helpful. Changing positions may also help. Yoga is a great way to cope with round ligament and low back pain in pregnancy.    Massage may also help with low back pain   Things to Consider at this Point in your Pregnancy:  Some women experience swelling in their feet during pregnancy. Compression stockings may help  Drink plenty of water and stay active   Make sure you are eating frequent small meals, nuts are a wonderful snack to keep with you            No follow-ups on file.      We have gone over prenatal care to include the timing and content of visits. I informed her how to contact the office and/or on call person in the event of any problems and encouraged her to do so when she feels it is necessary.  We then spent time answering her questions which she indicated were answered to her satisfaction.    Cale Del Rio MD  3/3/2025 14:48 EST

## 2025-03-03 NOTE — NURSING NOTE
, 37 6/7 weeks, presented with complaints of irregular contractions since 0130 this morning and possible fluid leaking around 0430 after using the bathroom. Denies bleeding. States fetal movement positive. Admitted to triage 3 for evaluation and monitoring. External monitors applied. Abdomen soft, non-tender to palpation. Irregular, mild to moderate contractions palpated. Amnisure collected and sent to lab, results pending. SVE /-1  Dr Sanchez notified and orders received.

## 2025-03-03 NOTE — ANESTHESIA PREPROCEDURE EVALUATION
Anesthesia Evaluation     Patient summary reviewed and Nursing notes reviewed   no history of anesthetic complications:   NPO Solid Status: > 8 hours  NPO Liquid Status: > 2 hours           Airway   Mallampati: II  TM distance: >3 FB  Neck ROM: full  No difficulty expected  Dental - normal exam     Pulmonary - negative pulmonary ROS and normal exam    breath sounds clear to auscultation  Cardiovascular - normal exam  Exercise tolerance: good (4-7 METS)    Rhythm: regular  Rate: normal    (+) hypertension      Neuro/Psych- negative ROS  GI/Hepatic/Renal/Endo    (+) GERD well controlled    Musculoskeletal (-) negative ROS    Abdominal    Substance History - negative use     OB/GYN    (+) Pregnant        Other - negative ROS       ROS/Med Hx Other:                Anesthesia Plan    ASA 2     epidural       Anesthetic plan, risks, benefits, and alternatives have been provided, discussed and informed consent has been obtained with: patient.    Plan discussed with CRNA.    CODE STATUS:    Level Of Support Discussed With: Patient  Code Status (Patient has no pulse and is not breathing): CPR (Attempt to Resuscitate)  Medical Interventions (Patient has pulse or is breathing): Full Support

## 2025-03-04 RX ADMIN — BENZOCAINE AND LEVOMENTHOL: 200; 5 SPRAY TOPICAL at 00:13

## 2025-03-04 RX ADMIN — DOCUSATE SODIUM 100 MG: 100 CAPSULE, LIQUID FILLED ORAL at 09:10

## 2025-03-04 RX ADMIN — WITCH HAZEL: 500 SOLUTION RECTAL; TOPICAL at 00:14

## 2025-03-04 RX ADMIN — IBUPROFEN 600 MG: 600 TABLET, FILM COATED ORAL at 09:10

## 2025-03-04 RX ADMIN — DOCUSATE SODIUM 100 MG: 100 CAPSULE, LIQUID FILLED ORAL at 21:50

## 2025-03-04 NOTE — PLAN OF CARE
Problem: Postpartum (Vaginal Delivery)  Goal: Successful Parent Role Transition  Outcome: Progressing  Goal: Hemostasis  Outcome: Progressing  Goal: Absence of Infection Signs and Symptoms  Outcome: Progressing  Goal: Anesthesia/Sedation Recovery  Outcome: Progressing  Intervention: Optimize Anesthesia Recovery  Recent Flowsheet Documentation  Taken 3/4/2025 0200 by Ángela Martinez RN  Safety Promotion/Fall Prevention: safety round/check completed  Goal: Optimal Pain Control and Function  Outcome: Progressing  Intervention: Prevent or Manage Pain  Recent Flowsheet Documentation  Taken 3/4/2025 0016 by Ángela Martinez RN  Perineal Care:   absorbent brief/pad changed   medicated pads applied   perineal hygiene encouraged   perineal spray bottle/warm water use encouraged   perineum cleansed  Pain Management Interventions:   care clustered   medication offered but refused  Goal: Effective Urinary Elimination  Outcome: Progressing     Problem: Pain Acute  Goal: Optimal Pain Control and Function  Outcome: Progressing  Intervention: Develop Pain Management Plan  Recent Flowsheet Documentation  Taken 3/4/2025 0016 by Ángela Martinez RN  Pain Management Interventions:   care clustered   medication offered but refused     Problem: Adult Inpatient Plan of Care  Goal: Plan of Care Review  Outcome: Progressing  Flowsheets (Taken 3/4/2025 0401)  Progress: improving  Outcome Evaluation: Ambulating and urinating well, continuing to monitor pain level and bleeding, fundus and VS WNL, progressing towards care plan goals. FO RN  Goal: Patient-Specific Goal (Individualized)  Outcome: Progressing  Goal: Absence of Hospital-Acquired Illness or Injury  Outcome: Progressing  Intervention: Identify and Manage Fall Risk  Recent Flowsheet Documentation  Taken 3/4/2025 0200 by Ángela Martinez RN  Safety Promotion/Fall Prevention: safety round/check completed  Intervention: Prevent Skin Injury  Recent Flowsheet Documentation  Taken 3/4/2025 0016 by  Ángela Martinez, RN  Body Position: position changed independently  Goal: Optimal Comfort and Wellbeing  Outcome: Progressing  Intervention: Monitor Pain and Promote Comfort  Recent Flowsheet Documentation  Taken 3/4/2025 0016 by Ángela Martinez, RN  Pain Management Interventions:   care clustered   medication offered but refused  Goal: Readiness for Transition of Care  Outcome: Progressing   Goal Outcome Evaluation:           Progress: improving  Outcome Evaluation: Ambulating and urinating well, continuing to monitor pain level and bleeding, fundus and VS WNL, progressing towards care plan goals. FO RN

## 2025-03-04 NOTE — ANESTHESIA POSTPROCEDURE EVALUATION
Patient: Taya Leonard    Procedure Summary       Date: 03/03/25 Room / Location:     Anesthesia Start: 1725 Anesthesia Stop: 1818    Procedure: LABOR ANALGESIA Diagnosis:     Scheduled Providers:  Provider: Arturo Luke CRNA    Anesthesia Type: epidural ASA Status: 2            Anesthesia Type: epidural    Vitals  Vitals Value Taken Time   /65 03/04/25 0515   Temp 36.7 °C (98.1 °F) 03/04/25 0515   Pulse 80 03/04/25 0515   Resp 16 03/04/25 0515   SpO2 100 % 03/03/25 1759   Vitals shown include unfiled device data.        Post Anesthesia Care and Evaluation    Patient location during evaluation: bedside  Patient participation: complete - patient participated  Level of consciousness: awake  Pain score: 0  Pain management: adequate    Airway patency: patent  Anesthetic complications: No anesthetic complications  PONV Status: controlled  Cardiovascular status: acceptable and stable  Respiratory status: acceptable  Hydration status: acceptable  Post Neuraxial Block status: Motor and sensory function returned to baseline and No signs or symptoms of PDPH

## 2025-03-04 NOTE — LACTATION NOTE
LN in to see this  patient. Patient had infant latched in cradle hold to R breast. Audible swallows noted and patient denied pain while feeding. Discussed attempting to feed baby on demand or at least every 2-3 hours. Feeding cues discussed. Encouraged her to do awake skin to skin as much as possible. LN discussed normal  feeding behavior and expected output during the first few days of breastfeeding. Patient states she purchased a breast pump through insurance for home use. Encouraged pt. to call out as needed for LN/staff assistance. Patient verbalized good understanding.

## 2025-03-04 NOTE — PROGRESS NOTES
Called by RN for increase bleeding;  FF below umb with very min trickle upon exam;  cont PP pit  AFVSS

## 2025-03-04 NOTE — LACTATION NOTE
This note was copied from a baby's chart.  LC in to follow up with breastfeeding progress. LC noted that she has started supplementing due to pain with breastfeeding. Left nipple is badly abraded and had been bleeding. LC provided some hydrogel pads for comfort and suggested pumping for now on the left breast.

## 2025-03-04 NOTE — PLAN OF CARE
Goal Outcome Evaluation:                    Outcome Evaluation  Vital signs stable. Tolerating PO intake. Ambulating appropriately. Needs and concerns addressed and answered.

## 2025-03-04 NOTE — PROGRESS NOTES
"[facility]  Vaginal Delivery Progress Note    Subjective   Postpartum Day 1: Vaginal Delivery    The patient feels well.  Her pain is well controlled with nonsteroidal anti-inflammatory drugs and Tylenol.   She is ambulating well.  Patient describes her bleeding as moderate lochia.        Objective     Vital Signs Range for the last 24 hours  Temperature: Temp:  [97.8 °F (36.6 °C)-99.7 °F (37.6 °C)] 98.1 °F (36.7 °C)   Temp Source: Temp src: Oral   BP: BP: (107-151)/() 107/65   Pulse: Heart Rate:  [] 80   Respirations: Resp:  [14-18] 16   SPO2: SpO2:  [100 %] 100 %   O2 Amount (l/min):     O2 Devices Device (Oxygen Therapy): room air   Weight: Weight:  [57.6 kg (127 lb)-57.7 kg (127 lb 3.2 oz)] 57.6 kg (127 lb)     Admit Height:  Height: 162.6 cm (64\")      Physical Exam:  General:  no acute distress.  Abdomen: abdomen is soft without significant tenderness, masses, organomegaly or guarding. Fundus: appropriate, firm, non tender  Extremities: normal, atraumatic, no cyanosis, and trace edema.       Lab results reviewed:  Yes   Rubella:  No results found for: \"RUBELLAIGGIN\" Nurse Transcribed from prenatal record --    Rubella Antibodies, IgG   Date Value Ref Range Status   08/08/2024 2.10 Immune >0.99 index Final     Comment:                                     Non-immune       <0.90                                  Equivocal  0.90 - 0.99                                  Immune           >0.99     Rh Status:    RH type   Date Value Ref Range Status   03/03/2025 Positive  Final     Immunizations:   Immunization History   Administered Date(s) Administered    DTaP 1998, 01/26/1999, 03/26/1999, 07/31/2000, 07/23/2003    Hep B, Adolescent or Pediatric 1998, 1998, 06/28/1999    Hepatitis B Adult/Adolescent IM 1998, 1998, 06/28/1999    HiB 1998, 01/26/1999, 03/26/1999, 12/23/1999    Hib (PRP-OMP) 1998, 01/26/1999, 03/26/1999, 12/23/1999    IPV 1998, 01/26/1999, " 03/26/1999, 11/23/1999, 12/23/1999, 07/31/2000, 07/23/2003    MMR 12/23/1999, 07/23/2003    Meningococcal MCV4P (Menactra) 09/23/2009    Tdap 09/23/2009, 04/01/2020    Varicella 10/06/1999, 09/23/2009       Assessment & Plan       Gestational hypertension      Taya Leonard is Day 1  post-partum  Vaginal, Spontaneous  .  She had extra bleeding last night and Dr. Ron evaluated patient.  Pitocin was restarted and the bleeding was under good control.    Plan:  Continue current care.      Cale Del Rio MD  3/4/2025  06:43 EST

## 2025-03-04 NOTE — PLAN OF CARE
Goal Outcome Evaluation:      Patient had successful spontaneous vaginal delivery. Attained normal uterine contraction pattern and fetal wellbeing was adequate. Afebrile during labor. No s/s of hemorrhage. Pain control attempted by epidural but progressed quickly so pain control was limited.

## 2025-03-04 NOTE — PLAN OF CARE
Goal Outcome Evaluation:  Plan of Care Reviewed With: patient        Progress: improving  Outcome Evaluation: Patient delivered vaginally. Recovering WDL. Postpartumn care plan to be intiated.

## 2025-03-05 VITALS
SYSTOLIC BLOOD PRESSURE: 116 MMHG | TEMPERATURE: 97.5 F | HEIGHT: 64 IN | HEART RATE: 111 BPM | OXYGEN SATURATION: 100 % | BODY MASS INDEX: 21.68 KG/M2 | RESPIRATION RATE: 16 BRPM | WEIGHT: 127 LBS | DIASTOLIC BLOOD PRESSURE: 72 MMHG

## 2025-03-05 PROCEDURE — 0503F POSTPARTUM CARE VISIT: CPT | Performed by: OBSTETRICS & GYNECOLOGY

## 2025-03-05 RX ORDER — ACETAMINOPHEN 325 MG/1
650 TABLET ORAL EVERY 6 HOURS PRN
Qty: 30 TABLET | Refills: 0 | Status: SHIPPED | OUTPATIENT
Start: 2025-03-05

## 2025-03-05 RX ORDER — IBUPROFEN 600 MG/1
600 TABLET, FILM COATED ORAL EVERY 6 HOURS PRN
Qty: 60 TABLET | Refills: 0 | Status: SHIPPED | OUTPATIENT
Start: 2025-03-05

## 2025-03-05 RX ADMIN — DOCUSATE SODIUM 100 MG: 100 CAPSULE, LIQUID FILLED ORAL at 08:18

## 2025-03-05 NOTE — LACTATION NOTE
LC in to follow up with lactation progress. Patient states she pumped once or twice through then night. Her left nipple is still red and tender but is getting better. She has changed to nipple cream for comfort. She denies pain with pumping. LC encouraged her to continue pumping with each baby feeding and if she requests lactation help to reintroduce breastfeeding that a lactation appt can be made and LC encouraged to do this before 2 weeks. LC noted that baby has a tight oral gape and continues to struggle feeding on a bottle. He has been doing better on the orthodontic nipple. Patient is planning on discharge today. LC discussed normal infant output patterns to expect and if infant is not waking by 3 hours to wake and feed using measures shown in the hospital. LC discussed checking to make sure new medications are safe to breastfeed. LC discussed alcohol use and cigarette/second hand smoke around baby and breastfeeding and discussed the impact of street drugs on infants and breastfeeding. LC used the page in the breastfeeding guide to discuss harmful effects of these. Breastfeeding/Lactation expectations and anticipatory guidance discussed for the next two weeks . LC discussed nipple care, plugged ducts, engorgement, and breast infection. LC encouraged mom to see pediatrician two days from discharge for follow up. Patient has a breastpump for home use and LC discussed good pumping guidelines and normal expectations with pumping and storage and preparation of ebm for feedings. LC discussed breastfeeding/lactation resources including the local breastfeeding support group after discharge and when to call the doctor. Patient showed good understanding.

## 2025-03-05 NOTE — PLAN OF CARE
Goal Outcome Evaluation:              Outcome Evaluation: Patient meeting criteria for discharge per MD order.

## 2025-03-05 NOTE — DISCHARGE SUMMARY
[facility]  Delivery Discharge Summary    Primary OB Clinician: Cale Del Rio MD    EDC: Estimated Date of Delivery: 3/18/25    Admitting Diagnosis:  Gestational hypertension [O13.9]    Discharge Diagnosis:  Same as Admitting plus:   Pregnancy at 37w6d - Delivered     Antepartum complications: gestational diabetes    Date of Delivery: 3/3/2025  Time of Delivery: 6:18 PM    Delivered By:  Cale Del Rio    Delivery Type: Vaginal, Spontaneous     Tubal Ligation: n/a    Baby:male infant;   Apgar:  8  @ 1 minute /   Apgar:  9  @ 5 minutes   Weight: 2670 g (5 lb 14.2 oz)   Length: 18    Anesthesia: Epidural     Intrapartum complications: None    Laceration: Perineal laceration.  Not repaired    Episiotomy: No    Placenta: Spontaneous    Feeding method: Breastfeeding Status: Yes    Discharge Date: 3/5/2025; Discharge Time: 07:22 EST        Plan:      Follow-up appointment with Dr. Del Rio in 6 weeks for postpartum exam.

## 2025-03-05 NOTE — PLAN OF CARE
Goal Outcome Evaluation:           Progress: improving  Outcome Evaluation: vss. pt ambulating in room ad vikram. no new concerns at this time.

## 2025-03-06 ENCOUNTER — MATERNAL SCREENING (OUTPATIENT)
Dept: CALL CENTER | Facility: HOSPITAL | Age: 25
End: 2025-03-06
Payer: COMMERCIAL

## 2025-03-06 NOTE — OUTREACH NOTE
Maternal Screening Survey      Flowsheet Row Responses   Eligibility Eligible   Prep survey completed? Yes   Facility patient discharged from? [facility] - Registered Nurse

## 2025-03-07 ENCOUNTER — DOCUMENTATION (OUTPATIENT)
Dept: OBSTETRICS AND GYNECOLOGY | Facility: CLINIC | Age: 25
End: 2025-03-07
Payer: COMMERCIAL

## 2025-03-07 NOTE — PROGRESS NOTES
Weekly MFM glucose log evaluation: Multiple attempts made to review blood sugar logs this week with no response.   MFM will plan to review blood glucose logs next week.

## 2025-03-10 ENCOUNTER — TELEPHONE (OUTPATIENT)
Dept: LACTATION | Facility: HOSPITAL | Age: 25
End: 2025-03-10
Payer: COMMERCIAL

## 2025-03-10 NOTE — TELEPHONE ENCOUNTER
ZACK called to check with this patient and her breastfeeding progress. Patient did not answer, so ZACK provided the lactation dept. number ([phone]) and encouraged her to call with any lactation questions or needs.

## 2025-03-14 ENCOUNTER — MATERNAL SCREENING (OUTPATIENT)
Dept: CALL CENTER | Facility: HOSPITAL | Age: 25
End: 2025-03-14
Payer: COMMERCIAL

## 2025-03-14 NOTE — OUTREACH NOTE
Maternal Screening Survey      Flowsheet Row Responses   Facility patient discharged from? [facility]   Attempt successful? Yes   Call start time 1110   Call end time 1111   I have been able to laugh and see the funny side of things. 0   I have looked forward with enjoyment to things. 0   I have blamed myself unnecessarily when things went wrong. 0   I have been anxious or worried for no good reason. 0   I have felt scared or panicky for no good reason. 0   Things have been getting on top of me. 0   I have been so unhappy that I have had difficulty sleeping. 0   I have felt sad or miserable. 0   I have been so unhappy that I have been crying. 0   The thought of harming myself has occurred to me. 0   [place]  Depression Scale Total 0   Did any of your parents have problems with alcohol or drug use? No   Do any of your peers have problems with alcohol or drug use? No   Does your partner have problems with alcohol or drug use? No   Before you were pregnant did you have problems with alcohol or drug use? (past) No   In the past month, did you drink beer, wine, liquor or use any other drugs? (pregnancy) No   Maternal Screening call completed Yes   Call end time 1111              [facility] - Registered Nurse

## 2025-03-18 ENCOUNTER — TELEPHONE (OUTPATIENT)
Dept: FAMILY MEDICINE CLINIC | Age: 25
End: 2025-03-18
Payer: COMMERCIAL

## 2025-03-18 NOTE — TELEPHONE ENCOUNTER
HUB TO READ- ATC PT DUE TO [facility] OFFBOARDING AFTER MAY. PT NEEDS TO ESTABLISH WITH ANOTHER PROVIDER IF THEY WOULD LIKE TO CONTINUE TO BE SEEN HERE.

## 2025-04-07 ENCOUNTER — TELEPHONE (OUTPATIENT)
Age: 25
End: 2025-04-07
Payer: COMMERCIAL

## 2025-04-07 NOTE — TELEPHONE ENCOUNTER
Patient called the office seeking medical advice. Patient is 5 Weeks Postpartum and complains of abdominal pain, mainly on right side that started on 4/6/25. Patient reports laying down for a nap and when she woke up, the pain had moved to her back on the same side and between her hips, making it hard for her to walk. Patient reports having spotting that started on 4/5/25 but only lasted for the afternoon. Informed patient to try alternating Ibuprofen and Tylenol since she hasn't taken any pain medication and she isn't breastfeeding. If the pain gets worse or if she starts experiencing any vaginal bleeding, she needs to call the office and we will get her scheduled this week. Otherwise, she needs to keep her appointment with the office on 4/14/25. Patient acknowledged and understood.

## 2025-04-10 ENCOUNTER — HOSPITAL ENCOUNTER (EMERGENCY)
Facility: HOSPITAL | Age: 25
Discharge: HOME | End: 2025-04-11
Attending: EMERGENCY MEDICINE
Payer: COMMERCIAL

## 2025-04-10 DIAGNOSIS — K59.00 CONSTIPATION, UNSPECIFIED CONSTIPATION TYPE: ICD-10-CM

## 2025-04-10 DIAGNOSIS — R79.89 ELEVATED LFTS: ICD-10-CM

## 2025-04-10 DIAGNOSIS — N30.00 ACUTE CYSTITIS WITHOUT HEMATURIA: Primary | ICD-10-CM

## 2025-04-10 LAB
BACTERIA UR QL AUTO: ABNORMAL /HPF
BASOPHILS # BLD AUTO: 0.04 10*3/MM3 (ref 0–0.2)
BASOPHILS NFR BLD AUTO: 0.8 % (ref 0–1.5)
BILIRUB UR QL STRIP: NEGATIVE
CLARITY UR: CLEAR
COLOR UR: YELLOW
DEPRECATED RDW RBC AUTO: 57.4 FL (ref 37–54)
EOSINOPHIL # BLD AUTO: 0.12 10*3/MM3 (ref 0–0.4)
EOSINOPHIL NFR BLD AUTO: 2.4 % (ref 0.3–6.2)
ERYTHROCYTE [DISTWIDTH] IN BLOOD BY AUTOMATED COUNT: 17.9 % (ref 12.3–15.4)
GLUCOSE UR STRIP-MCNC: NEGATIVE MG/DL
HCG INTACT+B SERPL-ACNC: <0.5 MIU/ML
HCT VFR BLD AUTO: 45 % (ref 34–46.6)
HGB BLD-MCNC: 14.7 G/DL (ref 12–15.9)
HGB UR QL STRIP.AUTO: NEGATIVE
HOLD SPECIMEN: NORMAL
HOLD SPECIMEN: NORMAL
HYALINE CASTS UR QL AUTO: ABNORMAL /LPF
IMM GRANULOCYTES # BLD AUTO: 0.01 10*3/MM3 (ref 0–0.05)
IMM GRANULOCYTES NFR BLD AUTO: 0.2 % (ref 0–0.5)
KETONES UR QL STRIP: NEGATIVE
LEUKOCYTE ESTERASE UR QL STRIP.AUTO: ABNORMAL
LYMPHOCYTES # BLD AUTO: 2.25 10*3/MM3 (ref 0.7–3.1)
LYMPHOCYTES NFR BLD AUTO: 45.8 % (ref 19.6–45.3)
MCH RBC QN AUTO: 28.5 PG (ref 26.6–33)
MCHC RBC AUTO-ENTMCNC: 32.7 G/DL (ref 31.5–35.7)
MCV RBC AUTO: 87.4 FL (ref 79–97)
MONOCYTES # BLD AUTO: 0.4 10*3/MM3 (ref 0.1–0.9)
MONOCYTES NFR BLD AUTO: 8.1 % (ref 5–12)
NEUTROPHILS NFR BLD AUTO: 2.09 10*3/MM3 (ref 1.7–7)
NEUTROPHILS NFR BLD AUTO: 42.7 % (ref 42.7–76)
NITRITE UR QL STRIP: NEGATIVE
NRBC BLD AUTO-RTO: 0 /100 WBC (ref 0–0.2)
PH UR STRIP.AUTO: 7 [PH] (ref 5–8)
PLATELET # BLD AUTO: 250 10*3/MM3 (ref 140–450)
PMV BLD AUTO: 9.9 FL (ref 6–12)
PROT UR QL STRIP: NEGATIVE
RBC # BLD AUTO: 5.15 10*6/MM3 (ref 3.77–5.28)
RBC # UR STRIP: ABNORMAL /HPF
REF LAB TEST METHOD: ABNORMAL
SP GR UR STRIP: <=1.005 (ref 1–1.03)
SQUAMOUS #/AREA URNS HPF: ABNORMAL /HPF
UROBILINOGEN UR QL STRIP: ABNORMAL
WBC # UR STRIP: ABNORMAL /HPF
WBC NRBC COR # BLD AUTO: 4.91 10*3/MM3 (ref 3.4–10.8)
WHOLE BLOOD HOLD COAG: NORMAL
WHOLE BLOOD HOLD SPECIMEN: NORMAL

## 2025-04-10 PROCEDURE — 96374 THER/PROPH/DIAG INJ IV PUSH: CPT

## 2025-04-10 PROCEDURE — 83690 ASSAY OF LIPASE: CPT | Performed by: EMERGENCY MEDICINE

## 2025-04-10 PROCEDURE — 25010000002 KETOROLAC TROMETHAMINE PER 15 MG: Performed by: NURSE PRACTITIONER

## 2025-04-10 PROCEDURE — 87086 URINE CULTURE/COLONY COUNT: CPT | Performed by: NURSE PRACTITIONER

## 2025-04-10 PROCEDURE — 25810000003 SODIUM CHLORIDE 0.9 % SOLUTION: Performed by: NURSE PRACTITIONER

## 2025-04-10 PROCEDURE — 84702 CHORIONIC GONADOTROPIN TEST: CPT | Performed by: EMERGENCY MEDICINE

## 2025-04-10 PROCEDURE — 81001 URINALYSIS AUTO W/SCOPE: CPT | Performed by: EMERGENCY MEDICINE

## 2025-04-10 PROCEDURE — 80053 COMPREHEN METABOLIC PANEL: CPT | Performed by: EMERGENCY MEDICINE

## 2025-04-10 PROCEDURE — 96361 HYDRATE IV INFUSION ADD-ON: CPT

## 2025-04-10 PROCEDURE — 85025 COMPLETE CBC W/AUTO DIFF WBC: CPT | Performed by: EMERGENCY MEDICINE

## 2025-04-10 PROCEDURE — 99285 EMERGENCY DEPT VISIT HI MDM: CPT

## 2025-04-10 RX ORDER — KETOROLAC TROMETHAMINE 15 MG/ML
15 INJECTION, SOLUTION INTRAMUSCULAR; INTRAVENOUS ONCE
Status: COMPLETED | OUTPATIENT
Start: 2025-04-11 | End: 2025-04-10

## 2025-04-10 RX ORDER — SODIUM CHLORIDE 0.9 % (FLUSH) 0.9 %
10 SYRINGE (ML) INJECTION AS NEEDED
Status: DISCONTINUED | OUTPATIENT
Start: 2025-04-10 | End: 2025-04-11 | Stop reason: HOSPADM

## 2025-04-10 RX ADMIN — KETOROLAC TROMETHAMINE 15 MG: 15 INJECTION, SOLUTION INTRAMUSCULAR; INTRAVENOUS at 23:44

## 2025-04-10 RX ADMIN — SODIUM CHLORIDE 1000 ML: 9 INJECTION, SOLUTION INTRAVENOUS at 23:44

## 2025-04-11 ENCOUNTER — TELEPHONE (OUTPATIENT)
Age: 25
End: 2025-04-11

## 2025-04-11 ENCOUNTER — APPOINTMENT (OUTPATIENT)
Dept: CT IMAGING | Facility: HOSPITAL | Age: 25
End: 2025-04-11
Payer: COMMERCIAL

## 2025-04-11 VITALS
BODY MASS INDEX: 19.23 KG/M2 | HEIGHT: 64 IN | DIASTOLIC BLOOD PRESSURE: 83 MMHG | RESPIRATION RATE: 20 BRPM | SYSTOLIC BLOOD PRESSURE: 133 MMHG | OXYGEN SATURATION: 98 % | WEIGHT: 112.66 LBS | HEART RATE: 92 BPM | TEMPERATURE: 97.8 F

## 2025-04-11 LAB
ALBUMIN SERPL-MCNC: 3.7 G/DL (ref 3.5–5.2)
ALBUMIN/GLOB SERPL: 1 G/DL
ALP SERPL-CCNC: 128 U/L (ref 39–117)
ALT SERPL W P-5'-P-CCNC: 84 U/L (ref 1–33)
ANION GAP SERPL CALCULATED.3IONS-SCNC: 13.2 MMOL/L (ref 5–15)
AST SERPL-CCNC: 51 U/L (ref 1–32)
BILIRUB SERPL-MCNC: <0.2 MG/DL (ref 0–1.2)
BUN SERPL-MCNC: 13 MG/DL (ref 6–20)
BUN/CREAT SERPL: 15.1 (ref 7–25)
CALCIUM SPEC-SCNC: 9 MG/DL (ref 8.6–10.5)
CHLORIDE SERPL-SCNC: 102 MMOL/L (ref 98–107)
CO2 SERPL-SCNC: 22.8 MMOL/L (ref 22–29)
CREAT SERPL-MCNC: 0.86 MG/DL (ref 0.57–1)
EGFRCR SERPLBLD CKD-EPI 2021: 95.7 ML/MIN/1.73
GLOBULIN UR ELPH-MCNC: 3.8 GM/DL
GLUCOSE SERPL-MCNC: 97 MG/DL (ref 65–99)
LIPASE SERPL-CCNC: 35 U/L (ref 13–60)
POTASSIUM SERPL-SCNC: 4.2 MMOL/L (ref 3.5–5.2)
PROT SERPL-MCNC: 7.5 G/DL (ref 6–8.5)
SODIUM SERPL-SCNC: 138 MMOL/L (ref 136–145)

## 2025-04-11 PROCEDURE — 96375 TX/PRO/DX INJ NEW DRUG ADDON: CPT

## 2025-04-11 PROCEDURE — 25510000001 IOPAMIDOL PER 1 ML: Performed by: EMERGENCY MEDICINE

## 2025-04-11 PROCEDURE — 25010000002 CEFTRIAXONE PER 250 MG: Performed by: NURSE PRACTITIONER

## 2025-04-11 PROCEDURE — 74177 CT ABD & PELVIS W/CONTRAST: CPT

## 2025-04-11 RX ORDER — IOPAMIDOL 755 MG/ML
100 INJECTION, SOLUTION INTRAVASCULAR
Status: COMPLETED | OUTPATIENT
Start: 2025-04-11 | End: 2025-04-11

## 2025-04-11 RX ORDER — CEFUROXIME AXETIL 500 MG/1
500 TABLET ORAL 2 TIMES DAILY
Qty: 14 TABLET | Refills: 0 | Status: SHIPPED | OUTPATIENT
Start: 2025-04-11 | End: 2025-04-18

## 2025-04-11 RX ORDER — PHENAZOPYRIDINE HYDROCHLORIDE 200 MG/1
200 TABLET, FILM COATED ORAL 3 TIMES DAILY
Qty: 6 TABLET | Refills: 0 | Status: SHIPPED | OUTPATIENT
Start: 2025-04-11 | End: 2025-04-13

## 2025-04-11 RX ADMIN — IOPAMIDOL 80 ML: 755 INJECTION, SOLUTION INTRAVENOUS at 00:20

## 2025-04-11 RX ADMIN — CEFTRIAXONE SODIUM 1000 MG: 1 INJECTION, POWDER, FOR SOLUTION INTRAMUSCULAR; INTRAVENOUS at 00:47

## 2025-04-11 NOTE — DISCHARGE INSTRUCTIONS
CT was unremarkable and showed only inflammation of the bladder and ureters likely from or urinary reflux.    Lab work did not show any abnormalities other than leukocytes in urine and mildly elevated LFTs.    Follow-up with your PCP.    Take OTC MiraLAX or magnesium citrate for constipation.    Take antibiotic as prescribed for UTI.    Alternate Tylenol and Motrin for pain

## 2025-04-11 NOTE — TELEPHONE ENCOUNTER
Caller: CRISTINE GROSS    [facility] call back number: [phone]    PATIENT CALLED REQUESTING TO CANCEL SAME DAY APPT.    Did the patient call AFTER the start time of their scheduled appointment?  []YES  [x]NO    Was the patient's appointment rescheduled? []YES  [x]NO    Any additional information: PT WENT TO ER LAST NIGHT

## 2025-04-11 NOTE — ED PROVIDER NOTES
Time: 11:14 PM EDT  Date of encounter:  4/10/2025  Independent Historian/Clinical History and Information was obtained by:   Patient and Family    History is limited by: N/A    Chief Complaint: abdominal pain      History of Present Illness:  Patient is a 24 y.o. year old female who presents to the emergency department for evaluation of lower abdominal pain. Pt states last few days having low ab pain, pressure, and cramping. Pt is 5 weeks postpartum s/p vaginal delivery. No complications.      Patient Care Team  Primary Care Provider: Rhina Leonard APRN    Past Medical History:     Allergies   Allergen Reactions    Amoxicillin Rash    Gluten Meal GI Intolerance    Ultrasound Gel [Gyne-Moistrin] Itching and Rash     Outline of EFM on belly for 2 weeks after having it applied      Past Medical History:   Diagnosis Date    Allergy to gluten     Gastritis 2024    GDM (gestational diabetes mellitus)     GDM (gestational diabetes mellitus)     GERD (gastroesophageal reflux disease) Jan 2023    Gestational hypertension 3/3/2025    Raynaud's phenomenon      Past Surgical History:   Procedure Laterality Date    COLONOSCOPY  03/26/2024    COLONOSCOPY N/A 3/26/2024    Procedure: COLONOSCOPY;  Surgeon: Darwin Cid MD;  Location: [facility] ENDOSCOPY;  Service: Gastroenterology;  Laterality: N/A;  NORMAL COLONOSCOPY    ENDOSCOPY  03/26/2024    ENDOSCOPY N/A 3/26/2024    Procedure: ESOPHAGOGASTRODUODENOSCOPY WITH BIOPSIES;  Surgeon: Darwin Cid MD;  Location: [facility] ENDOSCOPY;  Service: Gastroenterology;  Laterality: N/A;  NORMAL ENDOSCOPY     Family History   Problem Relation Age of Onset    Prostate cancer Paternal Grandfather     Colon cancer Maternal Grandfather     Breast cancer Neg Hx     Ovarian cancer Neg Hx     Uterine cancer Neg Hx        Home Medications:  Prior to Admission medications    Medication Sig Start Date End Date Taking? Authorizing Provider   acetaminophen (TYLENOL) 325 MG tablet Take 2  "tablets by mouth Every 6 (Six) Hours As Needed for Mild Pain (Second Line: Mild pain unrelieved by ibuprofen. Stagger doses 3 hours after dose of ibuprofen.). 3/5/25   Cale Del Rio MD   Blood Glucose Monitoring Suppl (ONE TOUCH ULTRA 2) w/Device kit  1/21/25   ProviderAnand MD   ferrous sulfate 325 (65 FE) MG tablet Take 1 tablet by mouth Daily With Breakfast. 2/10/25   Cale Del Rio MD   glucose monitor monitoring kit Use four times daily to check blood glucose. 1/21/25   Kira Gonzales APRN   ibuprofen (ADVIL,MOTRIN) 600 MG tablet Take 1 tablet by mouth Every 6 (Six) Hours As Needed for Mild Pain (First Line: Mild pain.). 3/5/25   Cale Del Rio MD   Lancets 30G misc Use to check blood sugar 7 times a day. Please dispense lancets compatible with the One Touch Ultra 2 Glucometer System. 2/11/25   Nancy Villalpando MD   prenatal vitamin (prenatal, CLASSIC, vitamin) tablet Take 1 tablet by mouth Daily.    ProviderAnand MD        Social History:   Social History     Tobacco Use    Smoking status: Never     Passive exposure: Never    Smokeless tobacco: Never   Vaping Use    Vaping status: Never Used   Substance Use Topics    Alcohol use: Never    Drug use: Never         Review of Systems:  Review of Systems   Constitutional:  Negative for chills and fever.   Gastrointestinal:  Positive for abdominal pain and nausea. Negative for diarrhea and vomiting.   Genitourinary:  Positive for frequency. Negative for dysuria and flank pain.   Psychiatric/Behavioral: Negative.     All other systems reviewed and are negative.       Physical Exam:  /83 (BP Location: Left arm, Patient Position: Sitting)   Pulse 92   Temp 97.8 °F (36.6 °C) (Oral)   Resp 20   Ht 162.6 cm (64\")   Wt 51.1 kg (112 lb 10.5 oz)   LMP 06/11/2024 (Exact Date)   SpO2 98%   Breastfeeding No   BMI 19.34 kg/m²     Physical Exam  Vitals and nursing note reviewed.   Constitutional:       General: She is not in acute " distress.     Appearance: Normal appearance. She is not toxic-appearing.   HENT:      Head: Normocephalic and atraumatic.      Mouth/Throat:      Mouth: Mucous membranes are moist.   Eyes:      General: No scleral icterus.  Cardiovascular:      Rate and Rhythm: Normal rate and regular rhythm.      Pulses: Normal pulses.      Heart sounds: Normal heart sounds.   Pulmonary:      Effort: Pulmonary effort is normal. No respiratory distress.      Breath sounds: Normal breath sounds.   Abdominal:      General: Bowel sounds are normal. There is no distension.      Palpations: Abdomen is soft.      Tenderness: There is abdominal tenderness in the right lower quadrant, suprapubic area and left lower quadrant. There is no right CVA tenderness or left CVA tenderness.      Hernia: No hernia is present.   Musculoskeletal:         General: Normal range of motion.      Cervical back: Normal range of motion and neck supple.   Skin:     General: Skin is warm and dry.   Neurological:      Mental Status: She is alert and oriented to person, place, and time. Mental status is at baseline.                    Medical Decision Making:      Comorbidities that affect care:    Diabetes, Hypertension    External Notes reviewed:    Previous Admission Note: Patient admitted on March 03 for delivery      The following orders were placed and all results were independently analyzed by me:  Orders Placed This Encounter   Procedures    Urine Culture - Urine, Urine, Clean Catch    CT Abdomen Pelvis With Contrast    [place] Draw    Comprehensive Metabolic Panel    Lipase    Urinalysis With Microscopic If Indicated (No Culture) - Urine, Clean Catch    hCG, Quantitative, Pregnancy    CBC Auto Differential    Urinalysis, Microscopic Only - Urine, Clean Catch    NPO Diet NPO Type: Strict NPO    Undress & Gown    Insert Peripheral IV    CBC & Differential    Green Top (Gel)    Lavender Top    Gold Top - SST    Light Blue Top       Medications Given in the  Emergency Department:  Medications   sodium chloride 0.9 % flush 10 mL (has no administration in time range)   sodium chloride 0.9 % bolus 1,000 mL (1,000 mL Intravenous New Bag 4/10/25 2344)   ketorolac (TORADOL) injection 15 mg (15 mg Intravenous Given 4/10/25 2344)   iopamidol (ISOVUE-370) 76 % injection 100 mL (80 mL Intravenous Given 4/11/25 0020)   cefTRIAXone (ROCEPHIN) in NS 1 gram/10ml IV PUSH syringe (1,000 mg Intravenous Given 4/11/25 0047)        ED Course:         Labs:    Lab Results (last 24 hours)       Procedure Component Value Units Date/Time    CBC & Differential [[id number]]  (Abnormal) Collected: 04/10/25 2329    Specimen: Blood Updated: 04/10/25 2336    Narrative:      The following orders were created for panel order CBC & Differential.  Procedure                               Abnormality         Status                     ---------                               -----------         ------                     CBC Auto Differential[[id number]]        Abnormal            Final result                 Please view results for these tests on the individual orders.    Comprehensive Metabolic Panel [[id number]]  (Abnormal) Collected: 04/10/25 2329    Specimen: Blood Updated: 04/11/25 0002     Glucose 97 mg/dL      BUN 13 mg/dL      Creatinine 0.86 mg/dL      Sodium 138 mmol/L      Potassium 4.2 mmol/L      Comment: Slight hemolysis detected by analyzer. Result may be falsely elevated.        Chloride 102 mmol/L      CO2 22.8 mmol/L      Calcium 9.0 mg/dL      Total Protein 7.5 g/dL      Albumin 3.7 g/dL      ALT (SGPT) 84 U/L      AST (SGOT) 51 U/L      Comment: Slight hemolysis detected by analyzer. Result may be falsely elevated.        Alkaline Phosphatase 128 U/L      Total Bilirubin <0.2 mg/dL      Globulin 3.8 gm/dL      A/G Ratio 1.0 g/dL      BUN/Creatinine Ratio 15.1     Anion Gap 13.2 mmol/L      eGFR 95.7 mL/min/1.73     Narrative:      GFR Categories in Chronic Kidney Disease (CKD)      GFR  Category          GFR (mL/min/1.73)    Interpretation  G1                     90 or greater         Normal or high (1)  G2                      60-89                Mild decrease (1)  G3a                   45-59                Mild to moderate decrease  G3b                   30-44                Moderate to severe decrease  G4                    15-29                Severe decrease  G5                    14 or less           Kidney failure          (1)In the absence of evidence of kidney disease, neither GFR category G1 or G2 fulfill the criteria for CKD.    eGFR calculation 2021 CKD-EPI creatinine equation, which does not include race as a factor    Lipase [[id number]]  (Normal) Collected: 04/10/25 2329    Specimen: Blood Updated: 04/11/25 0001     Lipase 35 U/L     hCG, Quantitative, Pregnancy [[id number]] Collected: 04/10/25 2329    Specimen: Blood Updated: 04/10/25 2359     HCG Quantitative <0.50 mIU/mL     Narrative:      HCG Ranges by Gestational Age    Females - non-pregnant premenopausal   </= 1mIU/mL HCG  Females - postmenopausal               </= 7mIU/mL HCG    3 Weeks       5.4   -      72 mIU/mL  4 Weeks      10.2   -     708 mIU/mL  5 Weeks       217   -   8,245 mIU/mL  6 Weeks       152   -  32,177 mIU/mL  7 Weeks     4,059   - 153,767 mIU/mL  8 Weeks    31,366   - 149,094 mIU/mL  9 Weeks    59,109   - 135,901 mIU/mL  10 Weeks   44,186   - 170,409 mIU/mL  12 Weeks   27,107   - 201,615 mIU/mL  14 Weeks   24,302   -  93,646 mIU/mL  15 Weeks   12,540   -  69,747 mIU/mL  16 Weeks    8,904   -  55,332 mIU/mL  17 Weeks    8,240   -  51,793 mIU/mL  18 Weeks    9,649   -  55,271 mIU/mL      CBC Auto Differential [[id number]]  (Abnormal) Collected: 04/10/25 2329    Specimen: Blood Updated: 04/10/25 2336     WBC 4.91 10*3/mm3      RBC 5.15 10*6/mm3      Hemoglobin 14.7 g/dL      Hematocrit 45.0 %      MCV 87.4 fL      MCH 28.5 pg      MCHC 32.7 g/dL      RDW 17.9 %      RDW-SD 57.4 fl      MPV 9.9 fL      Platelets  250 10*3/mm3      Neutrophil % 42.7 %      Lymphocyte % 45.8 %      Monocyte % 8.1 %      Eosinophil % 2.4 %      Basophil % 0.8 %      Immature Grans % 0.2 %      Neutrophils, Absolute 2.09 10*3/mm3      Lymphocytes, Absolute 2.25 10*3/mm3      Monocytes, Absolute 0.40 10*3/mm3      Eosinophils, Absolute 0.12 10*3/mm3      Basophils, Absolute 0.04 10*3/mm3      Immature Grans, Absolute 0.01 10*3/mm3      nRBC 0.0 /100 WBC     Urinalysis With Microscopic If Indicated (No Culture) - Urine, Clean Catch [[id number]]  (Abnormal) Collected: 04/10/25 2330    Specimen: Urine, Clean Catch Updated: 04/10/25 2342     Color, UA Yellow     Appearance, UA Clear     pH, UA 7.0     Specific Gravity, UA <=1.005     Glucose, UA Negative     Ketones, UA Negative     Bilirubin, UA Negative     Blood, UA Negative     Protein, UA Negative     Leuk Esterase, UA Moderate (2+)     Nitrite, UA Negative     Urobilinogen, UA 0.2 E.U./dL    Urinalysis, Microscopic Only - Urine, Clean Catch [[id number]]  (Abnormal) Collected: 04/10/25 2330    Specimen: Urine, Clean Catch Updated: 04/10/25 2342     RBC, UA 0-2 /HPF      WBC, UA 21-50 /HPF      Bacteria, UA None Seen /HPF      Squamous Epithelial Cells, UA 13-20 /HPF      Hyaline Casts, UA None Seen /LPF      Methodology Automated Microscopy             Imaging:    CT Abdomen Pelvis With Contrast  Result Date: 4/11/2025  CT ABDOMEN PELVIS W CONTRAST-  Date of exam: 4/11/2025 12:18 AM.  Indication: lower abd. pain  Comparison: None available.  TECHNIQUE: Axial CT images were obtained of the abdomen and pelvis following the uneventful intravenous administration of 80 mL (or less) of Isovue-370 contrast agent. Reconstructed 2D (two-dimensional) coronal and sagittal images were also obtained. Automated exposure control and iterative construction methods were used. Additionally, the radiation dose reduction device was turned on for each scan per the ALARA (As Low as Reasonably Achievable) protocol. No  oral contrast agent was administered for the study. Please see the electronic medical record, EMR (i.e., Epic), for the documented dose of intravenous contrast agent as well as the radiation dose.  FINDINGS: An age-indeterminate infectious/inflammatory cystitis cannot be excluded. The urinary bladder is mildly distended. No urinary bladder calculi are appreciated. There is mild-to-moderate bilateral hydronephrosis and hydroureter without an obstructing ureteral calculus. These findings may be related to vesicoureteral reflux disease. There may be bilateral infectious/inflammatory ureteritis, greater on the right than the left. No definite pyelonephritis is seen. Please correlate clinically. No intrarenal abscess is suggested. No perinephric fluid collection is seen. No acute retroperitoneal or intraperitoneal hematoma. No definite nonobstructing nephrolithiasis by enhanced CT.  Additionally, there is at least a moderate stool burden. Fecal stasis (or fecal retention) as with constipation is possible. Fecal impaction cannot be excluded. No mechanical bowel obstruction. No pathologic bowel wall thickening. No pneumoperitoneum or pneumatosis. No portal or mesenteric venous gas. No acute appendicitis, colitis, or diverticulitis.  The gallbladder is contracted. No acute cholecystitis or pancreatitis. No gallstones. No biliary ductal dilatation. No ascites. No aneurysmal dilatation of the aortoiliac arterial system. No enlarged intra-abdominal or intrapelvic lymph nodes. No suspicious uterine or adnexal mass. No adrenal mass. No acute findings are seen with regard to the liver or spleen. No acute fracture. No aggressive osseous lesion. No acute infiltrate is seen in the partially imaged lung bases.       1.  Possible age-indeterminate infectious/inflammatory cystitis. 2.  Bilateral ureteritis is possible, as well. 3.  There is mild-to-moderate hydronephrosis. Vesicoureteral reflux disease is possible. 4.  Probably no  acute pyelonephritis. 5.  No obstructing ureteral calculus is identified. 6.  No other acute findings are seen in the abdomen or pelvis by enhanced CT examination. 7.  Please see above comments for further detail.   Portions of this note were completed with a voice recognition program.  4/11/2025 12:34 AM by Wiley Ewing MD on Workstation: Sequent          Differential Diagnosis and Discussion:    Abdominal Pain: Based on the patient's signs and symptoms, I considered abdominal aortic aneurysm, small bowel obstruction, pancreatitis, acute cholecystitis, acute appendecitis, peptic ulcer disease, gastritis, colitis, endocrine disorders, irritable bowel syndrome and other differential diagnosis an etiology of the patient's abdominal pain.    PROCEDURES:    Labs were collected in the emergency department and all labs were reviewed and interpreted by me.  CT scan was performed in the emergency department and the CT scan radiology impression was interpreted by me.    No orders to display       Procedures    MDM  Number of Diagnoses or Management Options  Acute cystitis without hematuria  Constipation, unspecified constipation type  Elevated LFTs  Diagnosis management comments: Based on the results of the patient's urinalysis and urinary complaints, signs, symptoms, and diagnostic testing is consistent with a urinary tract infection. Patient is resting comfortably, is alert, and is in no distress. The repeat examination is unremarkable and benign. The patient has no signs of urosepsis. The patient was started on antibiotics in the emergency department and will be discharged with antibiotics as an outpatient. The patient was counseled to return to the ER for fever >100.5, intractable pain or vomiting, or any other concerns that the may have. The patient has expressed a clear and thorough understanding and agreed to follow up as instructed.         Amount and/or Complexity of Data Reviewed  Clinical lab tests: reviewed and  ordered  Tests in the radiology section of CPT®: reviewed and ordered  Tests in the medicine section of CPT®: ordered and reviewed    Risk of Complications, Morbidity, and/or Mortality  Presenting problems: moderate  Diagnostic procedures: moderate  Management options: low    Patient Progress  Patient progress: stable         Patient Care Considerations:    NARCOTICS: I considered prescribing opiate pain medication as an outpatient, however no emergent was CT or signs of ureteral obstruction      Consultants/Shared Management Plan:    None    Social Determinants of Health:    Patient has presented with family members who are responsible, reliable and will ensure follow up care.      Disposition and Care Coordination:    Discharged: The patient is suitable and stable for discharge with no need for consideration of admission.    I have explained the patient´s condition, diagnoses and treatment plan based on the information available to me at this time. I have answered questions and addressed any concerns. The patient has a good  understanding of the patient´s diagnosis, condition, and treatment plan as can be expected at this point. The vital signs have been stable. The patient´s condition is stable and appropriate for discharge from the emergency department.      The patient will pursue further outpatient evaluation with the primary care physician or other designated or consulting physician as outlined in the discharge instructions. They are agreeable to this plan of care and follow-up instructions have been explained in detail. The patient has received these instructions in written format and has expressed an understanding of the discharge instructions. The patient is aware that any significant change in condition or worsening of symptoms should prompt an immediate return to this or the closest emergency department or call to 911.  I have explained discharge medications and the need for follow up with the  patient/caretakers. This was also printed in the discharge instructions. Patient was discharged with the following medications and follow up:      Medication List        New Prescriptions      cefuroxime 500 MG tablet  Commonly known as: CEFTIN  Take 1 tablet by mouth 2 (Two) Times a Day for 7 days.     phenazopyridine 200 MG tablet  Commonly known as: PYRIDIUM  Take 1 tablet by mouth 3 (Three) Times a Day for 2 days.               Where to Get Your Medications        These medications were sent to [facility] [id number] - [place], [place] - [address] - [phone]  - [phone]  [address] [facility] [facility] [address]      Phone: [phone]   cefuroxime 500 MG tablet  phenazopyridine 200 MG tablet      Rhina Leonard, APRN  [address]  [address]  [facility] [address] [phone]    Schedule an appointment as soon as possible for a visit   For reevaluation and possible referral to gastroenterology if indicated       Final diagnoses:   Acute cystitis without hematuria   Constipation, unspecified constipation type   Elevated LFTs        ED Disposition       ED Disposition   Discharge    Condition   Stable    Comment   --               This medical record created using voice recognition software.             Emma Stone, MICHAEL  04/11/25 0046       Emma Stone APRN  04/11/25 0052

## 2025-04-12 ENCOUNTER — NURSE TRIAGE (OUTPATIENT)
Dept: CALL CENTER | Facility: HOSPITAL | Age: 25
End: 2025-04-12
Payer: COMMERCIAL

## 2025-04-12 LAB — BACTERIA SPEC AEROBE CULT: NORMAL

## 2025-04-12 NOTE — TELEPHONE ENCOUNTER
Need to give IV ATBX 24 hours to start working.  Oral will take 48 to 72 hrs. If worse during this time then go back to ER. Need to check temp with thermometer also.

## 2025-04-12 NOTE — TELEPHONE ENCOUNTER
Reason for Disposition   [1] Taking antibiotic < 72 hours (3 days) for UTI AND [2] painful urination or frequency is SAME (unchanged, not better)    Additional Information   Negative: Shock suspected (e.g., cold/pale/clammy skin, too weak to stand, low BP, rapid pulse)   Negative: Sounds like a life-threatening emergency to the triager   Negative: Urinary tract infection suspected, but not taking antibiotics   Negative: [1] Unable to urinate (or only a few drops) > 4 hours AND [2] bladder feels very full (e.g., palpable bladder or strong urge to urinate)   Negative: Passing pure blood or large blood clots (i.e., size > a dime)  (Exceptions: Flecks, small strands, or pinkish-red color)   Negative: Fever > 103 F (39.4 C)   Negative: Patient sounds very sick or weak to the triager   Negative: [1] SEVERE pain (e.g., excruciating) AND [2] no improvement 2 hours after pain medications   Negative: [1] Fever > 100.0 F (37.8 C) AND [2] new-onset since starting antibiotics   Negative: [1] Side (flank) or lower back pain AND [2] new-onset since starting antibiotics   Negative: [1] Taking antibiotic > 24 hours for UTI AND [2] flank or lower back pain getting WORSE   Negative: [1] Vomiting 2 or more times AND [2] interferes with taking oral antibiotic   Negative: [1] Taking antibiotic > 24 hours for UTI (urinary tract or bladder infection) AND [2] fever persists (still has fever)   Negative: [1] Taking antibiotic > 72 hours (3 days) for UTI AND [2] painful urination or frequency is SAME (unchanged, not better)   Negative: [1] Taking antibiotic > 72 hours (3 days) for UTI AND [2] flank or lower back pain is SAME (unchanged, not better)   Negative: Diabetes mellitus or weak immune system (e.g., HIV positive, cancer chemo, splenectomy, organ transplant, chronic steroids)   Negative: Sickle cell disease   Negative: [1] Taking antibiotic < 24 hours for UTI AND [2] fever persists (still has fever)    Answer Assessment - Initial  "Assessment Questions  1. MAIN SYMPTOM: \"What is the main symptom you are concerned about?\" (e.g., painful urination, urine frequency)     Lower abdominal pain, pressure, cramping. 5 weeks post partum. Seen in ER last night.  DX uti  2. BETTER-SAME-WORSE: \"Are you getting better, staying the same, or getting worse compared to how you felt at your last visit to the doctor (most recent medical visit)?\"      worse  3. PAIN: \"How bad is the pain?\"  (e.g., Scale 1-10; mild, moderate, or severe)    - MILD (1-3): complains slightly about urination hurting    - MODERATE (4-7): interferes with normal activities      - SEVERE (8-10): excruciating, unwilling or unable to urinate because of the pain       7  4. FEVER: \"Do you have a fever?\" If Yes, ask: \"What is it, how was it measured, and when did it start?\"      Has not checked but reports chills  5. OTHER SYMPTOMS: \"Do you have any other symptoms?\" (e.g., blood in the urine, flank pain, vaginal discharge)      Abdominal pain and pressure in lower abdomen  6. DIAGNOSIS: \"When was the UTI diagnosed?\" \"By whom?\" \"Was it a kidney infection, bladder infection or both?\"      ER yes  7. ANTIBIOTIC: \"What antibiotic(s) are you taking?\" \"How many times per day?\"      IV rocephin last night and began Ceftin oral  8. ANTIBIOTIC - START DATE: \"When did you start taking the antibiotic?\"      Today ceftin    Protocols used: Urinary Tract Infection on Antibiotic Follow-up Call - Female-ADULT-    "

## 2025-04-14 ENCOUNTER — HOSPITAL ENCOUNTER (EMERGENCY)
Facility: HOSPITAL | Age: 25
Discharge: HOME | End: 2025-04-14
Attending: EMERGENCY MEDICINE | Admitting: EMERGENCY MEDICINE
Payer: COMMERCIAL

## 2025-04-14 ENCOUNTER — TELEPHONE (OUTPATIENT)
Age: 25
End: 2025-04-14

## 2025-04-14 ENCOUNTER — APPOINTMENT (OUTPATIENT)
Dept: CT IMAGING | Facility: HOSPITAL | Age: 25
End: 2025-04-14
Payer: COMMERCIAL

## 2025-04-14 VITALS
SYSTOLIC BLOOD PRESSURE: 111 MMHG | RESPIRATION RATE: 16 BRPM | TEMPERATURE: 98 F | BODY MASS INDEX: 18.07 KG/M2 | HEIGHT: 64 IN | HEART RATE: 101 BPM | WEIGHT: 105.82 LBS | OXYGEN SATURATION: 100 % | DIASTOLIC BLOOD PRESSURE: 77 MMHG

## 2025-04-14 DIAGNOSIS — N13.30 HYDRONEPHROSIS, UNSPECIFIED HYDRONEPHROSIS TYPE: Primary | ICD-10-CM

## 2025-04-14 DIAGNOSIS — N39.0 URINARY TRACT INFECTION WITHOUT HEMATURIA, SITE UNSPECIFIED: ICD-10-CM

## 2025-04-14 LAB
ALBUMIN SERPL-MCNC: 3.9 G/DL (ref 3.5–5.2)
ALBUMIN/GLOB SERPL: 1 G/DL
ALP SERPL-CCNC: 94 U/L (ref 39–117)
ALT SERPL W P-5'-P-CCNC: 82 U/L (ref 1–33)
ANION GAP SERPL CALCULATED.3IONS-SCNC: 12.8 MMOL/L (ref 5–15)
AST SERPL-CCNC: 48 U/L (ref 1–32)
BACTERIA UR QL AUTO: ABNORMAL /HPF
BASOPHILS # BLD AUTO: 0.02 10*3/MM3 (ref 0–0.2)
BASOPHILS NFR BLD AUTO: 0.4 % (ref 0–1.5)
BILIRUB SERPL-MCNC: 0.3 MG/DL (ref 0–1.2)
BILIRUB UR QL STRIP: NEGATIVE
BUN SERPL-MCNC: 15 MG/DL (ref 6–20)
BUN/CREAT SERPL: 19 (ref 7–25)
CALCIUM SPEC-SCNC: 9.2 MG/DL (ref 8.6–10.5)
CHLORIDE SERPL-SCNC: 99 MMOL/L (ref 98–107)
CLARITY UR: CLEAR
CO2 SERPL-SCNC: 20.2 MMOL/L (ref 22–29)
COLOR UR: YELLOW
CREAT SERPL-MCNC: 0.79 MG/DL (ref 0.57–1)
DEPRECATED RDW RBC AUTO: 54.4 FL (ref 37–54)
EGFRCR SERPLBLD CKD-EPI 2021: 106 ML/MIN/1.73
EOSINOPHIL # BLD AUTO: 0.03 10*3/MM3 (ref 0–0.4)
EOSINOPHIL NFR BLD AUTO: 0.6 % (ref 0.3–6.2)
ERYTHROCYTE [DISTWIDTH] IN BLOOD BY AUTOMATED COUNT: 17.3 % (ref 12.3–15.4)
GLOBULIN UR ELPH-MCNC: 3.8 GM/DL
GLUCOSE SERPL-MCNC: 99 MG/DL (ref 65–99)
GLUCOSE UR STRIP-MCNC: NEGATIVE MG/DL
HCG INTACT+B SERPL-ACNC: <0.5 MIU/ML
HCT VFR BLD AUTO: 45.9 % (ref 34–46.6)
HGB BLD-MCNC: 15.2 G/DL (ref 12–15.9)
HGB UR QL STRIP.AUTO: ABNORMAL
HOLD SPECIMEN: NORMAL
HOLD SPECIMEN: NORMAL
HYALINE CASTS UR QL AUTO: ABNORMAL /LPF
IMM GRANULOCYTES # BLD AUTO: 0 10*3/MM3 (ref 0–0.05)
IMM GRANULOCYTES NFR BLD AUTO: 0 % (ref 0–0.5)
KETONES UR QL STRIP: NEGATIVE
LEUKOCYTE ESTERASE UR QL STRIP.AUTO: ABNORMAL
LIPASE SERPL-CCNC: 23 U/L (ref 13–60)
LYMPHOCYTES # BLD AUTO: 1.62 10*3/MM3 (ref 0.7–3.1)
LYMPHOCYTES NFR BLD AUTO: 33.8 % (ref 19.6–45.3)
MCH RBC QN AUTO: 28.5 PG (ref 26.6–33)
MCHC RBC AUTO-ENTMCNC: 33.1 G/DL (ref 31.5–35.7)
MCV RBC AUTO: 86 FL (ref 79–97)
MONOCYTES # BLD AUTO: 0.39 10*3/MM3 (ref 0.1–0.9)
MONOCYTES NFR BLD AUTO: 8.1 % (ref 5–12)
NEUTROPHILS NFR BLD AUTO: 2.74 10*3/MM3 (ref 1.7–7)
NEUTROPHILS NFR BLD AUTO: 57.1 % (ref 42.7–76)
NITRITE UR QL STRIP: NEGATIVE
NRBC BLD AUTO-RTO: 0 /100 WBC (ref 0–0.2)
PH UR STRIP.AUTO: 6.5 [PH] (ref 5–8)
PLATELET # BLD AUTO: 250 10*3/MM3 (ref 140–450)
PMV BLD AUTO: 9.7 FL (ref 6–12)
POTASSIUM SERPL-SCNC: 3.7 MMOL/L (ref 3.5–5.2)
PROT SERPL-MCNC: 7.7 G/DL (ref 6–8.5)
PROT UR QL STRIP: NEGATIVE
RBC # BLD AUTO: 5.34 10*6/MM3 (ref 3.77–5.28)
RBC # UR STRIP: ABNORMAL /HPF
REF LAB TEST METHOD: ABNORMAL
SODIUM SERPL-SCNC: 132 MMOL/L (ref 136–145)
SP GR UR STRIP: <=1.005 (ref 1–1.03)
SQUAMOUS #/AREA URNS HPF: ABNORMAL /HPF
UROBILINOGEN UR QL STRIP: ABNORMAL
WBC # UR STRIP: ABNORMAL /HPF
WBC NRBC COR # BLD AUTO: 4.8 10*3/MM3 (ref 3.4–10.8)
WHOLE BLOOD HOLD COAG: NORMAL
WHOLE BLOOD HOLD SPECIMEN: NORMAL
YEAST URNS QL MICRO: ABNORMAL /HPF

## 2025-04-14 PROCEDURE — 85025 COMPLETE CBC W/AUTO DIFF WBC: CPT | Performed by: EMERGENCY MEDICINE

## 2025-04-14 PROCEDURE — 81001 URINALYSIS AUTO W/SCOPE: CPT | Performed by: EMERGENCY MEDICINE

## 2025-04-14 PROCEDURE — 25810000003 SODIUM CHLORIDE 0.9 % SOLUTION

## 2025-04-14 PROCEDURE — 25510000001 IOPAMIDOL PER 1 ML: Performed by: EMERGENCY MEDICINE

## 2025-04-14 PROCEDURE — 84702 CHORIONIC GONADOTROPIN TEST: CPT | Performed by: EMERGENCY MEDICINE

## 2025-04-14 PROCEDURE — 74177 CT ABD & PELVIS W/CONTRAST: CPT

## 2025-04-14 PROCEDURE — 80053 COMPREHEN METABOLIC PANEL: CPT | Performed by: EMERGENCY MEDICINE

## 2025-04-14 PROCEDURE — 83690 ASSAY OF LIPASE: CPT | Performed by: EMERGENCY MEDICINE

## 2025-04-14 PROCEDURE — 99285 EMERGENCY DEPT VISIT HI MDM: CPT

## 2025-04-14 RX ORDER — IOPAMIDOL 755 MG/ML
100 INJECTION, SOLUTION INTRAVASCULAR
Status: COMPLETED | OUTPATIENT
Start: 2025-04-14 | End: 2025-04-14

## 2025-04-14 RX ORDER — SODIUM CHLORIDE 0.9 % (FLUSH) 0.9 %
10 SYRINGE (ML) INJECTION AS NEEDED
Status: DISCONTINUED | OUTPATIENT
Start: 2025-04-14 | End: 2025-04-14 | Stop reason: HOSPADM

## 2025-04-14 RX ADMIN — SODIUM CHLORIDE 1000 ML: 9 INJECTION, SOLUTION INTRAVENOUS at 14:06

## 2025-04-14 RX ADMIN — IOPAMIDOL 70 ML: 755 INJECTION, SOLUTION INTRAVENOUS at 13:28

## 2025-04-14 NOTE — ED PROVIDER NOTES
Time: 2:58 PM EDT  Date of encounter:  4/14/2025  Independent Historian/Clinical History and Information was obtained by:   Patient    History is limited by: N/A    Chief Complaint: Abdominal pain      History of Present Illness:  Patient is a 24 y.o. year old female who presents to the emergency department for evaluation of abdominal pain, bilateral flank pain, dysuria ongoing for 5 days.  Patient reports she was seen here 4 days ago and was diagnosed with a UTI and started on antibiotics at that time.  Patient reports pain has worsened since that time.  Denies any fevers, chills, body aches.  Patient reports she is 6 weeks postpartum.  Patient reports her follow-up appointment with OB was this morning but she canceled it to come here due to increased pain.  Denies any difficulty passing urine.      Patient Care Team  Primary Care Provider: Gloria Ryan APRN    Past Medical History:     Allergies   Allergen Reactions    Amoxicillin Rash    Gluten Meal GI Intolerance    Ultrasound Gel [Gyne-Moistrin] Itching and Rash     Outline of EFM on belly for 2 weeks after having it applied      Past Medical History:   Diagnosis Date    Allergy to gluten     Gastritis 2024    GDM (gestational diabetes mellitus)     GDM (gestational diabetes mellitus)     GERD (gastroesophageal reflux disease) Jan 2023    Gestational hypertension 3/3/2025    Raynaud's phenomenon      Past Surgical History:   Procedure Laterality Date    COLONOSCOPY  03/26/2024    COLONOSCOPY N/A 3/26/2024    Procedure: COLONOSCOPY;  Surgeon: Darwin Cid MD;  Location: [facility] ENDOSCOPY;  Service: Gastroenterology;  Laterality: N/A;  NORMAL COLONOSCOPY    ENDOSCOPY  03/26/2024    ENDOSCOPY N/A 3/26/2024    Procedure: ESOPHAGOGASTRODUODENOSCOPY WITH BIOPSIES;  Surgeon: Darwin Cid MD;  Location: [facility] ENDOSCOPY;  Service: Gastroenterology;  Laterality: N/A;  NORMAL ENDOSCOPY     Family History   Problem Relation Age of Onset     Prostate cancer Paternal Grandfather     Colon cancer Maternal Grandfather     Breast cancer Neg Hx     Ovarian cancer Neg Hx     Uterine cancer Neg Hx        Home Medications:  Prior to Admission medications    Medication Sig Start Date End Date Taking? Authorizing Provider   acetaminophen (TYLENOL) 325 MG tablet Take 2 tablets by mouth Every 6 (Six) Hours As Needed for Mild Pain (Second Line: Mild pain unrelieved by ibuprofen. Stagger doses 3 hours after dose of ibuprofen.). 3/5/25   Cale Del Rio MD   Blood Glucose Monitoring Suppl (ONE TOUCH ULTRA 2) w/Device kit  1/21/25   ProviderAnand MD   cefuroxime (CEFTIN) 500 MG tablet Take 1 tablet by mouth 2 (Two) Times a Day for 7 days. 4/11/25 4/18/25  Emma Stone APRN   ferrous sulfate 325 (65 FE) MG tablet Take 1 tablet by mouth Daily With Breakfast. 2/10/25   Cale Del Rio MD   glucose monitor monitoring kit Use four times daily to check blood glucose. 1/21/25   Kira Gonzales APRN   ibuprofen (ADVIL,MOTRIN) 600 MG tablet Take 1 tablet by mouth Every 6 (Six) Hours As Needed for Mild Pain (First Line: Mild pain.). 3/5/25   Cale Del Rio MD   Lancets 30G misc Use to check blood sugar 7 times a day. Please dispense lancets compatible with the One Touch Ultra 2 Glucometer System. 2/11/25   Nancy Villalpando MD   phenazopyridine (PYRIDIUM) 200 MG tablet Take 1 tablet by mouth 3 (Three) Times a Day for 2 days. 4/11/25 4/13/25  Emma Stone APRN   prenatal vitamin (prenatal, CLASSIC, vitamin) tablet Take 1 tablet by mouth Daily.    ProviderAnand MD        Social History:   Social History     Tobacco Use    Smoking status: Never     Passive exposure: Never    Smokeless tobacco: Never   Vaping Use    Vaping status: Never Used   Substance Use Topics    Alcohol use: Never    Drug use: Never         Review of Systems:  Review of Systems   Constitutional:  Negative for chills, fatigue and fever.   HENT:  Negative for ear pain, rhinorrhea and  "sore throat.    Eyes:  Negative for visual disturbance.   Respiratory:  Negative for cough and shortness of breath.    Cardiovascular:  Negative for chest pain.   Gastrointestinal:  Positive for abdominal pain. Negative for diarrhea and vomiting.   Genitourinary:  Positive for dysuria and flank pain. Negative for difficulty urinating.   Musculoskeletal:  Negative for arthralgias, back pain and myalgias.   Skin:  Negative for rash.   Neurological:  Negative for light-headedness and headaches.   Hematological:  Negative for adenopathy.   Psychiatric/Behavioral: Negative.          Physical Exam:  /79   Pulse 99   Temp 98 °F (36.7 °C) (Oral)   Resp 16   Ht 162.6 cm (64\")   Wt 48 kg (105 lb 13.1 oz)   LMP 06/11/2024 (Exact Date)   SpO2 100%   Breastfeeding No   BMI 18.16 kg/m²     Physical Exam  Vitals and nursing note reviewed.   Constitutional:       General: She is not in acute distress.     Appearance: Normal appearance. She is not toxic-appearing.   HENT:      Head: Normocephalic and atraumatic.      Nose: Nose normal.      Mouth/Throat:      Mouth: Mucous membranes are moist.   Eyes:      Conjunctiva/sclera: Conjunctivae normal.   Cardiovascular:      Rate and Rhythm: Normal rate.      Pulses: Normal pulses.      Heart sounds: Normal heart sounds.   Pulmonary:      Effort: Pulmonary effort is normal.      Breath sounds: Normal breath sounds.   Abdominal:      General: Bowel sounds are normal.      Palpations: Abdomen is soft.      Tenderness: There is abdominal tenderness in the right lower quadrant and left lower quadrant. There is right CVA tenderness. There is no left CVA tenderness.   Musculoskeletal:         General: Normal range of motion.      Cervical back: Normal range of motion.   Skin:     General: Skin is warm and dry.   Neurological:      General: No focal deficit present.      Mental Status: She is alert and oriented to person, place, and time.   Psychiatric:         Mood and Affect: " Mood normal.         Behavior: Behavior normal.         Thought Content: Thought content normal.         Judgment: Judgment normal.                    Medical Decision Making:      Comorbidities that affect care:    None    External Notes reviewed:    None      The following orders were placed and all results were independently analyzed by me:  Orders Placed This Encounter   Procedures    CT Abdomen Pelvis With Contrast    [place] Draw    Comprehensive Metabolic Panel    Lipase    Urinalysis With Microscopic If Indicated (No Culture) - Urine, Clean Catch    hCG, Quantitative, Pregnancy    CBC Auto Differential    Urinalysis, Microscopic Only - Urine, Clean Catch    Ambulatory Referral to Urology    NPO Diet NPO Type: Strict NPO    Undress & Gown    Insert Peripheral IV    CBC & Differential    Green Top (Gel)    Lavender Top    Gold Top - SST    Light Blue Top       Medications Given in the Emergency Department:  Medications   sodium chloride 0.9 % flush 10 mL (has no administration in time range)   sodium chloride 0.9 % bolus 1,000 mL (1,000 mL Intravenous New Bag 4/14/25 1406)   iopamidol (ISOVUE-370) 76 % injection 100 mL (70 mL Intravenous Given 4/14/25 1328)        ED Course:         Labs:    Lab Results (last 24 hours)       Procedure Component Value Units Date/Time    Urinalysis With Microscopic If Indicated (No Culture) - Urine, Clean Catch [[id number]]  (Abnormal) Collected: 04/14/25 1111    Specimen: Urine, Clean Catch Updated: 04/14/25 1136     Color, UA Yellow     Appearance, UA Clear     pH, UA 6.5     Specific Gravity, UA <=1.005     Glucose, UA Negative     Ketones, UA Negative     Bilirubin, UA Negative     Blood, UA Trace     Protein, UA Negative     Leuk Esterase, UA Small (1+)     Nitrite, UA Negative     Urobilinogen, UA 0.2 E.U./dL    Urinalysis, Microscopic Only - Urine, Clean Catch [[id number]]  (Abnormal) Collected: 04/14/25 1111    Specimen: Urine, Clean Catch Updated: 04/14/25 1156     RBC,  UA 0-2 /HPF      WBC, UA 6-10 /HPF      Bacteria, UA None Seen /HPF      Squamous Epithelial Cells, UA 0-2 /HPF      Yeast, UA Small/1+ Yeast /HPF      Hyaline Casts, UA 0-2 /LPF      Methodology Manual Light Microscopy    CBC & Differential [[id number]]  (Abnormal) Collected: 04/14/25 1112    Specimen: Blood Updated: 04/14/25 1119    Narrative:      The following orders were created for panel order CBC & Differential.  Procedure                               Abnormality         Status                     ---------                               -----------         ------                     CBC Auto Differential[[id number]]        Abnormal            Final result                 Please view results for these tests on the individual orders.    Comprehensive Metabolic Panel [[id number]]  (Abnormal) Collected: 04/14/25 1112    Specimen: Blood Updated: 04/14/25 1136     Glucose 99 mg/dL      BUN 15 mg/dL      Creatinine 0.79 mg/dL      Sodium 132 mmol/L      Potassium 3.7 mmol/L      Chloride 99 mmol/L      CO2 20.2 mmol/L      Calcium 9.2 mg/dL      Total Protein 7.7 g/dL      Albumin 3.9 g/dL      ALT (SGPT) 82 U/L      AST (SGOT) 48 U/L      Alkaline Phosphatase 94 U/L      Total Bilirubin 0.3 mg/dL      Globulin 3.8 gm/dL      A/G Ratio 1.0 g/dL      BUN/Creatinine Ratio 19.0     Anion Gap 12.8 mmol/L      eGFR 106.0 mL/min/1.73     Narrative:      GFR Categories in Chronic Kidney Disease (CKD)      GFR Category          GFR (mL/min/1.73)    Interpretation  G1                     90 or greater         Normal or high (1)  G2                      60-89                Mild decrease (1)  G3a                   45-59                Mild to moderate decrease  G3b                   30-44                Moderate to severe decrease  G4                    15-29                Severe decrease  G5                    14 or less           Kidney failure          (1)In the absence of evidence of kidney disease, neither GFR category G1  or G2 fulfill the criteria for CKD.    eGFR calculation 2021 CKD-EPI creatinine equation, which does not include race as a factor    Lipase [[id number]]  (Normal) Collected: 04/14/25 1112    Specimen: Blood Updated: 04/14/25 1136     Lipase 23 U/L     hCG, Quantitative, Pregnancy [[id number]] Collected: 04/14/25 1112    Specimen: Blood Updated: 04/14/25 1135     HCG Quantitative <0.50 mIU/mL     Narrative:      HCG Ranges by Gestational Age    Females - non-pregnant premenopausal   </= 1mIU/mL HCG  Females - postmenopausal               </= 7mIU/mL HCG    3 Weeks       5.4   -      72 mIU/mL  4 Weeks      10.2   -     708 mIU/mL  5 Weeks       217   -   8,245 mIU/mL  6 Weeks       152   -  32,177 mIU/mL  7 Weeks     4,059   - 153,767 mIU/mL  8 Weeks    31,366   - 149,094 mIU/mL  9 Weeks    59,109   - 135,901 mIU/mL  10 Weeks   44,186   - 170,409 mIU/mL  12 Weeks   27,107   - 201,615 mIU/mL  14 Weeks   24,302   -  93,646 mIU/mL  15 Weeks   12,540   -  69,747 mIU/mL  16 Weeks    8,904   -  55,332 mIU/mL  17 Weeks    8,240   -  51,793 mIU/mL  18 Weeks    9,649   -  55,271 mIU/mL      CBC Auto Differential [[id number]]  (Abnormal) Collected: 04/14/25 1112    Specimen: Blood Updated: 04/14/25 1119     WBC 4.80 10*3/mm3      RBC 5.34 10*6/mm3      Hemoglobin 15.2 g/dL      Hematocrit 45.9 %      MCV 86.0 fL      MCH 28.5 pg      MCHC 33.1 g/dL      RDW 17.3 %      RDW-SD 54.4 fl      MPV 9.7 fL      Platelets 250 10*3/mm3      Neutrophil % 57.1 %      Lymphocyte % 33.8 %      Monocyte % 8.1 %      Eosinophil % 0.6 %      Basophil % 0.4 %      Immature Grans % 0.0 %      Neutrophils, Absolute 2.74 10*3/mm3      Lymphocytes, Absolute 1.62 10*3/mm3      Monocytes, Absolute 0.39 10*3/mm3      Eosinophils, Absolute 0.03 10*3/mm3      Basophils, Absolute 0.02 10*3/mm3      Immature Grans, Absolute 0.00 10*3/mm3      nRBC 0.0 /100 WBC              Imaging:    CT Abdomen Pelvis With Contrast  Result Date: 4/14/2025  CT ABDOMEN  PELVIS W CONTRAST Date of Exam: 4/14/2025 1:21 PM EDT Indication: flank pain, dysuria. Comparison: 4/11/2025 Technique: Axial CT images were obtained of the abdomen and pelvis after the uneventful intravenous administration of iodinated contrast. Reconstructed coronal and sagittal images were also obtained. Automated exposure control and iterative construction methods were used. Findings: LUNG BASES:  Unremarkable without mass or infiltrate. LIVER:  Unremarkable parenchyma without focal lesion. BILIARY/GALLBLADDER:  Unremarkable SPLEEN:  Unremarkable PANCREAS:  Unremarkable ADRENAL:  Unremarkable KIDNEYS:  Unremarkable parenchyma with no solid mass identified. There is minimal bilateral hydroureteronephrosis, right worse than left. No calculus identified. GASTROINTESTINAL/MESENTERY:  No evidence of obstruction nor inflammation.  MESENTERIC VESSELS:  Patent. AORTA/IVC:  Normal caliber. RETROPERITONEUM/LYMPH NODES:  Unremarkable REPRODUCTIVE:  Unremarkable BLADDER:  Unremarkable OSSEUS STRUCTURES:  Typical for age with no acute process identified. There is minimal free fluid in the pelvis.     Impression: 1.Minimal hydroureteronephrosis without etiology identified. Findings are similar if not slightly improved. 2.Nonspecific free fluid in the pelvis. 3.No urinary bladder inflammatory changes are noted currently. No findings to suggest pyelonephritis. Electronically Signed: Robson Lassiter MD  4/14/2025 1:41 PM EDT  Workstation ID: [id number]        Differential Diagnosis and Discussion:    Abdominal Pain: Based on the patient's signs and symptoms, I considered abdominal aortic aneurysm, small bowel obstruction, pancreatitis, acute cholecystitis, acute appendecitis, peptic ulcer disease, gastritis, colitis, endocrine disorders, irritable bowel syndrome and other differential diagnosis an etiology of the patient's abdominal pain.    PROCEDURES:    Labs were collected in the emergency department and all labs were reviewed  and interpreted by me.  CT scan was performed in the emergency department and the CT scan radiology impression was interpreted by me.    No orders to display       Procedures    MDM  Number of Diagnoses or Management Options  Hydronephrosis, unspecified hydronephrosis type  Urinary tract infection without hematuria, site unspecified  Diagnosis management comments: I have explained the patient´s condition, diagnoses and treatment plan based on the information available to me at this time. I have answered questions and addressed any concerns. The patient has a good  understanding of the patient´s diagnosis, condition, and treatment plan as can be expected at this point. The vital signs have been stable. The patient´s condition is stable and appropriate for discharge from the emergency department.      The patient will pursue further outpatient evaluation with the primary care physician or other designated or consulting physician as outlined in the discharge instructions. They are agreeable to this plan of care and follow-up instructions have been explained in detail. The patient has received these instructions in written format and has expressed an understanding of the discharge instructions. The patient is aware that any significant change in condition or worsening of symptoms should prompt an immediate return to this or the closest emergency department or call to 911.         Amount and/or Complexity of Data Reviewed  Decide to obtain previous medical records or to obtain history from someone other than the patient: yes                       Patient Care Considerations:    SEPSIS was considered but is NOT present in the emergency department as SIRS criteria is not present.      Consultants/Shared Management Plan:    None    Social Determinants of Health:    Patient is independent, reliable, and has access to care.       Disposition and Care Coordination:    Discharged: The patient is suitable and stable for  discharge with no need for consideration of admission.    I have explained the patient´s condition, diagnoses and treatment plan based on the information available to me at this time. I have answered questions and addressed any concerns. The patient has a good  understanding of the patient´s diagnosis, condition, and treatment plan as can be expected at this point. The vital signs have been stable. The patient´s condition is stable and appropriate for discharge from the emergency department.      The patient will pursue further outpatient evaluation with the primary care physician or other designated or consulting physician as outlined in the discharge instructions. They are agreeable to this plan of care and follow-up instructions have been explained in detail. The patient has received these instructions in written format and has expressed an understanding of the discharge instructions. The patient is aware that any significant change in condition or worsening of symptoms should prompt an immediate return to this or the closest emergency department or call to 911.  I have explained discharge medications and the need for follow up with the patient/caretakers. This was also printed in the discharge instructions. Patient was discharged with the following medications and follow up:      Medication List        Stop      phenazopyridine 200 MG tablet  Commonly known as: PYRIDIUM           [facility]  [address]  [facility] [address] [phone]  Schedule an appointment as soon as possible for a visit       Gloria Ryan, MICHAEL  [address]  [address]  [place] [address]  [phone]    Go to   As needed       Final diagnoses:   Hydronephrosis, unspecified hydronephrosis type   Urinary tract infection without hematuria, site unspecified        ED Disposition       ED Disposition   Discharge    Condition   Stable    Comment   --               This  medical record created using voice recognition software.             Anitha Law, APRN  04/14/25 3016

## 2025-04-14 NOTE — DISCHARGE INSTRUCTIONS
Please follow-up with primary care provider as needed.  Return to the ED for any new or worsening concerning symptoms.  Someone should contact you to schedule appointment with a urologist.  If you do not hear from them in 1-2 days you may contact the number on this packet to schedule an appointment.  Please continue taking the antibiotics that were previously prescribed to you for urinary tract infection.

## 2025-04-14 NOTE — TELEPHONE ENCOUNTER
Provider: DR. LOCKHART    Caller: Taya Leonard    Relationship to Patient: Self    Pharmacy: [facility]    Phone Number: [phone]    Reason for Call: POSTPARTUM PT - NO SHOWED TODAY DUE TO BEING AT THE ER DUE TO UTI PAIN - ER ADV HER AFTER CT SCAN THAT SHE HAD FLUID BACKED UP IN HER KIDNEYS, WANTED HER TO SEE DR. LOCKHART TO MAKE SURE IT'S NOT RELATED TO PREGNANCY.    When was the patient last seen: DELIVERED 3-3

## 2025-04-16 ENCOUNTER — POSTPARTUM VISIT (OUTPATIENT)
Age: 25
End: 2025-04-16
Payer: COMMERCIAL

## 2025-04-16 VITALS
WEIGHT: 112.3 LBS | HEIGHT: 64 IN | BODY MASS INDEX: 19.17 KG/M2 | SYSTOLIC BLOOD PRESSURE: 106 MMHG | DIASTOLIC BLOOD PRESSURE: 70 MMHG

## 2025-04-16 DIAGNOSIS — R10.2 PELVIC CRAMPING: ICD-10-CM

## 2025-04-16 DIAGNOSIS — O24.410 DIET CONTROLLED GESTATIONAL DIABETES MELLITUS (GDM) IN THIRD TRIMESTER: ICD-10-CM

## 2025-04-16 PROCEDURE — 87801 DETECT AGNT MULT DNA AMPLI: CPT

## 2025-04-16 PROCEDURE — 87661 TRICHOMONAS VAGINALIS AMPLIF: CPT

## 2025-04-16 PROCEDURE — 87491 CHLMYD TRACH DNA AMP PROBE: CPT

## 2025-04-16 PROCEDURE — 87798 DETECT AGENT NOS DNA AMP: CPT

## 2025-04-16 PROCEDURE — 87591 N.GONORRHOEAE DNA AMP PROB: CPT

## 2025-04-16 NOTE — PROGRESS NOTES
"Subjective     Chief Complaint   Patient presents with    Postpartum Care     7 weeks Postpartum Check, Vaginal delivery, Baby BOY, Pt is currently formula feeding, Pt was seen at the ER on  for abdominal pain, Pt reports they told her she has a UTI and fluid on her kidneys            Taya Leonard is a 24 y.o. female who presents for a postpartum visit. She is 6 weeks postpartum following a spontaneous vaginal birth. I have fully reviewed the prenatal and intrapartum course. Postpartum course has been uneventful. Baby is feeding by bottle. Bleeding has been normal in amount and decreasing.. Bowel function is normal. Bladder function is normal. Patient not sexually active at this time. Contraception method is discussed. Postpartum depression screening: negative.  She went to the hospital twice and was diagnosed with uti taking an antibiotic  She is still taking ceftin  On her period today has some off and on cramping  Bleeding is a little heavier than a regular period but not soaking a pad   Baby is doing well  Denies fever or malaise  Was having right cva pain that has improved  Denies any vaginal sx or dysuria     CT Abdomen Pelvis With Contrast (2025 00:25)  CT Abdomen Pelvis With Contrast (2025 13:27)         Postpartum Depression: Low Risk  (2025)    [place]  Depression Scale     Last EPDS Total Score: 0     Last EPDS Self Harm Result: Never        The following portions of the patient's history were reviewed and updated as appropriate: allergies, current medications, past family history, past medical history, past social history, past surgical history and problem list.    Review of Systems  Pertinent items are noted in HPI.    Objective   /70   Ht 162.6 cm (64\")   Wt 50.9 kg (112 lb 4.8 oz)   LMP 04/15/2025 (Exact Date)   Breastfeeding No   BMI 19.28 kg/m²    General:  alert    Breasts:  normal       Heart:  regular rate and rhytm   Abdomen: Normal findings    " Vulva:  normal   Vagina: Normal, menses noted   Cervix:  Normal with no cervical motion tenderness   Corpus: Normal for post partum visit   Adnexa:  Non tender, non enlarged         Diagnoses and all orders for this visit:    1. Postpartum follow-up (Primary)    2. Diet controlled gestational diabetes mellitus (GDM) in third trimester  -     Glucose Tolerance, 2 Hours; Future    3. Pelvic cramping  -     NuSwab VG+ - Swab, Vagina       Normal postpartum exam. Pap smear not done at today's visit.     Contraception: discussed she will think about options   Slow return to normal activities reviewed. Continue prenatal vitamins.  Discussed warning s/s of when to go to the ER  Plan 2 hr gtt d/t gdm  Nuswab to r/o infection    Problem List Items Addressed This Visit       GDM (gestational diabetes mellitus)    Relevant Orders    Glucose Tolerance, 2 Hours     Other Visit Diagnoses         Postpartum follow-up    -  Primary      Pelvic cramping        Relevant Orders    NuSwab VG+ - Swab, Vagina                 EMR Dragon/ Transcription disclaimer:  Much of the encounter note is an electronic transcription/translation of spoken language to printed text. The electronic translation of spoken language may permit erroneous, or at times, nonessential words or phrases to be inadvertently transcribes; Although i have reviewed the note for such errors, some may still exist.

## 2025-04-17 ENCOUNTER — TELEPHONE (OUTPATIENT)
Age: 25
End: 2025-04-17
Payer: COMMERCIAL

## 2025-04-17 NOTE — TELEPHONE ENCOUNTER
Called patient and told her Kira's recommendations to finish her antibiotic and continue to monitor her symptoms. If pain worsens or fever occurs, patient needs to return to ER for evaluation. Informed patient that we will call with the test results as soon as possible, patient acknowledged and understood.

## 2025-04-17 NOTE — TELEPHONE ENCOUNTER
Patient called the office regarding antibiotics that she has been taking for a UTI. Patient reports that she is due to finish her prescription tomorrow morning but her back pain has not gotten any better. Patient wants to know if she should take a different medication or if she should follow-up with her PCP. I told the patient that I would forward her information to the provider and return her call as soon as possible. Patient acknowledged and understood.

## 2025-04-18 ENCOUNTER — TELEPHONE (OUTPATIENT)
Age: 25
End: 2025-04-18
Payer: COMMERCIAL

## 2025-04-18 ENCOUNTER — OFFICE VISIT (OUTPATIENT)
Dept: FAMILY MEDICINE CLINIC | Age: 25
End: 2025-04-18
Payer: COMMERCIAL

## 2025-04-18 VITALS
BODY MASS INDEX: 18.68 KG/M2 | SYSTOLIC BLOOD PRESSURE: 119 MMHG | OXYGEN SATURATION: 98 % | HEIGHT: 64 IN | DIASTOLIC BLOOD PRESSURE: 82 MMHG | HEART RATE: 91 BPM | WEIGHT: 109.4 LBS | TEMPERATURE: 98.3 F

## 2025-04-18 DIAGNOSIS — N30.90 CYSTITIS: ICD-10-CM

## 2025-04-18 DIAGNOSIS — D64.9 ANEMIA, UNSPECIFIED TYPE: ICD-10-CM

## 2025-04-18 DIAGNOSIS — R74.8 ELEVATED LIVER ENZYMES: ICD-10-CM

## 2025-04-18 DIAGNOSIS — Z13.1 SCREENING FOR DIABETES MELLITUS: Primary | ICD-10-CM

## 2025-04-18 LAB
BACTERIA UR QL AUTO: ABNORMAL /HPF
BILIRUB UR QL STRIP: NEGATIVE
CLARITY UR: CLEAR
COLOR UR: ABNORMAL
GLUCOSE UR STRIP-MCNC: NEGATIVE MG/DL
HGB UR QL STRIP.AUTO: ABNORMAL
KETONES UR QL STRIP: NEGATIVE
LEUKOCYTE ESTERASE UR QL STRIP.AUTO: NEGATIVE
NITRITE UR QL STRIP: NEGATIVE
PH UR STRIP.AUTO: 7 [PH] (ref 5–8)
PROT UR QL STRIP: NEGATIVE
RBC # UR STRIP: ABNORMAL /HPF
REF LAB TEST METHOD: ABNORMAL
SP GR UR STRIP: 1.01 (ref 1–1.03)
SQUAMOUS #/AREA URNS HPF: ABNORMAL /HPF
UROBILINOGEN UR QL STRIP: ABNORMAL
WBC # UR STRIP: ABNORMAL /HPF

## 2025-04-18 PROCEDURE — 81001 URINALYSIS AUTO W/SCOPE: CPT | Performed by: NURSE PRACTITIONER

## 2025-04-18 NOTE — PROGRESS NOTES
Chief Complaint  Back Pain (Pt was treated for a UTI last week at the [facility] ER, pt states she was also told she had Hydronephrosis. She is still having back pain and has finished Ceftin )    Subjective        Taya Leonard presents to [facility] today for       History of Present Illness  The patient is a 24-year-old female who presents for evaluation of low back pain and a recent urinary tract infection.    Symptoms began on 04/06/2025, with pressure in the lower abdomen, pain across the stomach, and radiating into the back. Emergency care was sought due to severe dizziness ,not feeling well,  resulting in a diagnosis of a urinary tract infection (UTI) and elevated liver enzymes. A previous diagnosis of hydronephrosis was also noted. Despite being on antibiotics, she returned to the ER due to persistent back pain but was informed that her UTI was improving. She has been referred to urology and advised to consult her OB-GYN. She completed her course of cefdinir, missing only one dose. Reports no fever, no burning or vaginal issues. Did have a baby recently and had a epidural for the delivery     A history of anemia during pregnancy and gestational diabetes is noted. An endoscopy performed previously revealed potential medication overuse or gluten sensitivity. Adhering to a gluten-free diet has alleviated her stomach issues.          Current Outpatient Medications:     prenatal vitamin (prenatal, CLASSIC, vitamin) tablet, Take 1 tablet by mouth Daily., Disp: , Rfl:     acetaminophen (TYLENOL) 325 MG tablet, Take 2 tablets by mouth Every 6 (Six) Hours As Needed for Mild Pain (Second Line: Mild pain unrelieved by ibuprofen. Stagger doses 3 hours after dose of ibuprofen.). (Patient not taking: Reported on 4/18/2025), Disp: 30 tablet, Rfl: 0    ferrous sulfate 325 (65 FE) MG tablet, Take 1 tablet by mouth Daily With Breakfast. (Patient not taking: Reported on 4/18/2025), Disp: 30  "tablet, Rfl: 1    glucose monitor monitoring kit, Use four times daily to check blood glucose. (Patient not taking: Reported on 4/18/2025), Disp: 1 each, Rfl: 0    ibuprofen (ADVIL,MOTRIN) 600 MG tablet, Take 1 tablet by mouth Every 6 (Six) Hours As Needed for Mild Pain (First Line: Mild pain.). (Patient not taking: Reported on 4/18/2025), Disp: 60 tablet, Rfl: 0    Lancets 30G misc, Use to check blood sugar 7 times a day. Please dispense lancets compatible with the One Touch Ultra 2 Glucometer System. (Patient not taking: Reported on 4/18/2025), Disp: 200 each, Rfl: 10  Medications Discontinued During This Encounter   Medication Reason    Blood Glucose Monitoring Suppl (ONE TOUCH ULTRA 2) w/Device kit *Therapy completed    cefuroxime (CEFTIN) 500 MG tablet *Therapy completed         Allergies:  Amoxicillin, Gluten meal, and Ultrasound gel [gyne-moistrin]      Objective   Vital Signs:   Vitals:    04/18/25 1320   BP: 119/82   BP Location: Left arm   Patient Position: Sitting   Cuff Size: Adult   Pulse: 91   Temp: 98.3 °F (36.8 °C)   TempSrc: Oral   SpO2: 98%   Weight: 49.6 kg (109 lb 6.4 oz)   Height: 162.6 cm (64\")     Body mass index is 18.78 kg/m².  BMI is within normal parameters. No other follow-up for BMI required.        Physical Exam  Constitutional:       Appearance: Normal appearance.   Neck:      Vascular: No carotid bruit.   Cardiovascular:      Rate and Rhythm: Normal rate and regular rhythm.      Heart sounds: Normal heart sounds.   Pulmonary:      Effort: Pulmonary effort is normal.      Breath sounds: Normal breath sounds.   Musculoskeletal:         General: Tenderness (tenderness right sided low back ,no tenderness over spinal area) present. Normal range of motion.   Skin:     General: Skin is warm and dry.   Neurological:      General: No focal deficit present.      Mental Status: She is alert.   Psychiatric:         Mood and Affect: Mood normal.         Behavior: Behavior normal.             Lab " Results   Component Value Date    GLUCOSE 99 04/14/2025    BUN 15 04/14/2025    CREATININE 0.79 04/14/2025    BCR 19.0 04/14/2025    K 3.7 04/14/2025    CO2 20.2 (L) 04/14/2025    CALCIUM 9.2 04/14/2025    ALBUMIN 3.9 04/14/2025    AST 48 (H) 04/14/2025    ALT 82 (H) 04/14/2025       Lab Results   Component Value Date    CHOL 200 02/08/2023    TRIG 54 02/08/2023    HDL 91 (H) 02/08/2023    LDL 99 02/08/2023       Lab Results   Component Value Date    WBC 4.80 04/14/2025    HGB 15.2 04/14/2025    HCT 45.9 04/14/2025    MCV 86.0 04/14/2025     04/14/2025               CT Abdomen Pelvis With Contrast (04/14/2025 13:27)       Procedures         Diagnoses and all orders for this visit:    1. Screening for diabetes mellitus (Primary)  -     Hemoglobin A1c; Future    2. Cystitis  -     Urinalysis With Culture If Indicated - Urine, Clean Catch  -     Urinalysis, Microscopic Only - Urine, Clean Catch    3. Elevated liver enzymes  -     Comprehensive Metabolic Panel; Future    4. Anemia, unspecified type  -     CBC & Differential; Future  -     Iron and TIBC; Future  -     Vitamin B12 & Folate; Future          Assessment & Plan  1. Low back pain.  - Symptoms may be related to recent postpartum changes and potential fluid in the kidneys.  - Physical exam indicates pain in the kidney region, right low back, exacerbated by movement.  - Advised to avoid alcohol and ibuprofen to prevent further elevation of liver enzymes.  - Urine test ordered today to ensure the urinary tract infection has resolved; follow-up with OB-GYN recommended.    2. Recent urinary tract infection.  - Previously diagnosed with a UTI and treated with cefdinir, missing only one dose.  - Urine test ordered today to ensure the infection has cleared.  - If the urine culture shows any abnormalities, appropriate treatment will be initiated.  - Referral to urology for further evaluation due to minimal hydronephrosis noted, ordered by ER     3. Elevated liver  enzymes.  - Elevated liver enzymes noted, potentially due to medication use or other factors.  - Advised to avoid alcohol and ibuprofen to prevent further elevation.  - Comprehensive metabolic panel (CMP) to be repeated in a month to monitor liver enzymes.  - Patient reports a history of gluten sensitivity, which has improved with a gluten-free diet.    4. Anemia.  - History of anemia during pregnancy.  - Iron levels to be checked along with the CMP in a month.  - Patient requested iron level check due to previous anemia during pregnancy.    5. Gestational diabetes.  - History of gestational diabetes.  - Screening A1c test to be conducted along with the CMP in a month.  - Additional tests for B12 and folate levels to be performed during the same visit.              Follow Up  Return if symptoms worsen or fail to improve, for If not improving or symptoms are getting worse.  Patient was given instructions and counseling regarding her condition or for health maintenance advice. Please see specific information pulled into the AVS if appropriate.           MICHAEL Trotter  04/18/2025    Please note that portions of this document were completed using a voice recognition program.     Patient or patient representative verbalized consent for the use of Ambient Listening during the visit with  MICHAEL Trotter for chart documentation. 4/18/2025  13:26 EDT

## 2025-04-18 NOTE — TELEPHONE ENCOUNTER
I called and spoke with the patient about how she was feeling today. Patient reports that she is still having pain in her back but it hasn't worsened, it remains about the same. Patient stated that she feels like it may be related to more of a muscle pain instead of her kidneys. I recommended to the patient that if her pain becomes intolerable, she needed to return to the ED. Patient was referred to Urology for Hydronephrosis diagnosis in ED but wanted to make sure it was okay for her to see them. I recommended seeing them just to make sure everything is okay. Informed patient that we would call with her test results as soon as we receive and review them, patient acknowledged and understood.

## 2025-04-19 LAB
A VAGINAE DNA VAG QL NAA+PROBE: NORMAL SCORE
BVAB2 DNA VAG QL NAA+PROBE: NORMAL SCORE
C ALBICANS DNA VAG QL NAA+PROBE: NEGATIVE
C GLABRATA DNA VAG QL NAA+PROBE: NEGATIVE
C TRACH DNA SPEC QL NAA+PROBE: NEGATIVE
MEGA1 DNA VAG QL NAA+PROBE: NORMAL SCORE
N GONORRHOEA DNA VAG QL NAA+PROBE: NEGATIVE
T VAGINALIS DNA VAG QL NAA+PROBE: NEGATIVE

## 2025-04-21 ENCOUNTER — TELEPHONE (OUTPATIENT)
Age: 25
End: 2025-04-21
Payer: COMMERCIAL

## 2025-04-21 NOTE — TELEPHONE ENCOUNTER
Attempted to call patient, left voicemail to return call to the office. Patient needs to have 2 Hour Glucose Testing done since she is now postpartum. Patient can have this testing done at the [facility] location in [place], it is the building located beside [facility]. It is a walk-in lab and patient needs to drink 75 grams of the glucose drink for this test. The orders for this test has already been placed. If patient has any questions, she can be put through to the office.      HUB OK TO READ/RELAY

## 2025-04-21 NOTE — TELEPHONE ENCOUNTER
Patient returned call from earlier in the day regarding her 2 Hour Glucose Testing. I told the patient where she could go to get this done and no appointment was required. Patient asked questions regarding the fluid on her kidneys. I told the patient to make sure she scheduled an appointment with Urology since she was referred to them to make sure everything is okay. Patient also states having spotting and yellow, watery discharge over the weekend. I told the patient that the spotting could be her menstrual cycle trying to start since delivery and her swab results were normal and showing no signs of infection. Patient acknowledged and understood.

## 2025-04-22 ENCOUNTER — TELEPHONE (OUTPATIENT)
Dept: UROLOGY | Age: 25
End: 2025-04-22

## 2025-04-22 ENCOUNTER — HOSPITAL ENCOUNTER (OUTPATIENT)
Dept: ULTRASOUND IMAGING | Facility: HOSPITAL | Age: 25
Discharge: HOME | End: 2025-04-22
Admitting: NURSE PRACTITIONER
Payer: COMMERCIAL

## 2025-04-22 ENCOUNTER — OFFICE VISIT (OUTPATIENT)
Dept: FAMILY MEDICINE CLINIC | Age: 25
End: 2025-04-22
Payer: COMMERCIAL

## 2025-04-22 VITALS
HEIGHT: 64 IN | SYSTOLIC BLOOD PRESSURE: 112 MMHG | OXYGEN SATURATION: 98 % | TEMPERATURE: 97.5 F | WEIGHT: 106 LBS | DIASTOLIC BLOOD PRESSURE: 82 MMHG | BODY MASS INDEX: 18.1 KG/M2 | HEART RATE: 110 BPM

## 2025-04-22 DIAGNOSIS — R10.84 GENERALIZED ABDOMINAL PAIN: ICD-10-CM

## 2025-04-22 DIAGNOSIS — M54.50 LOW BACK PAIN WITHOUT SCIATICA, UNSPECIFIED BACK PAIN LATERALITY, UNSPECIFIED CHRONICITY: ICD-10-CM

## 2025-04-22 DIAGNOSIS — R35.0 URINARY FREQUENCY: Primary | ICD-10-CM

## 2025-04-22 LAB
BILIRUB BLD-MCNC: NEGATIVE MG/DL
CLARITY, POC: CLEAR
COLOR UR: YELLOW
EXPIRATION DATE: ABNORMAL
GLUCOSE UR STRIP-MCNC: NEGATIVE MG/DL
KETONES UR QL: NEGATIVE
LEUKOCYTE EST, POC: NEGATIVE
Lab: ABNORMAL
NITRITE UR-MCNC: NEGATIVE MG/ML
PH UR: 6 [PH] (ref 5–8)
PROT UR STRIP-MCNC: ABNORMAL MG/DL
RBC # UR STRIP: NEGATIVE /UL
SP GR UR: 1 (ref 1–1.03)
UROBILINOGEN UR QL: NORMAL

## 2025-04-22 PROCEDURE — 76700 US EXAM ABDOM COMPLETE: CPT

## 2025-04-22 NOTE — PROGRESS NOTES
"Chief Complaint  Back Pain (Seen on 4/18 for this by Claudia RODRIGUEZ ), Urinary Frequency, and Abdominal Pain (Started 3 days ago )    Subjective          Taya Yeni Leonard presents to [facility]     Patient is a 24-year-old female who is experiencing them persisting abdominal and back pain started about 3 weeks ago.  She delivered a baby via vaginal delivery 7 weeks ago.  She has been to the emergency room on 2 separate occasions.  April 11 CT abdomen and pelvis with contrast noted age-indeterminate infectious inflammatory cystitis bilateral ureteritis and mild to moderate hydronephrosis with possible vesicoureteral reflux.  She completed cefdinir.  Is going to see urology next week.  As pain had not improved much on April 14 she went back and had CT imaging again.  Minimal hydroureteronephrosis and no bladder inflammation noted.  Has not noted any fever and while is had some mild nausea that she denies vomiting, diarrhea, any current constipation, acid reflux, or heartburn.  She does have history of gastritis.     Objective   Vital Signs:   Vitals:    04/22/25 1328   BP: 112/82   BP Location: Left arm   Patient Position: Sitting   Cuff Size: Adult   Pulse: 110   Temp: 97.5 °F (36.4 °C)   TempSrc: Oral   SpO2: 98%   Weight: 48.1 kg (106 lb)   Height: 162.6 cm (64\")       Wt Readings from Last 3 Encounters:   04/22/25 48.1 kg (106 lb)   04/18/25 49.6 kg (109 lb 6.4 oz)   04/16/25 50.9 kg (112 lb 4.8 oz)      BP Readings from Last 3 Encounters:   04/22/25 112/82   04/18/25 119/82   04/16/25 106/70       Body mass index is 18.19 kg/m².           Physical Exam  Vitals reviewed.   Constitutional:       General: She is not in acute distress.     Appearance: Normal appearance. She is well-developed.   Cardiovascular:      Rate and Rhythm: Normal rate and regular rhythm.      Heart sounds: Normal heart sounds.   Pulmonary:      Effort: Pulmonary effort is normal.      Breath " sounds: Normal breath sounds.       Abdominal:      General: Bowel sounds are normal. There is no distension.      Palpations: Abdomen is soft. There is no mass.      Tenderness: There is no guarding or rebound.       Musculoskeletal:      Right lower leg: No edema.      Left lower leg: No edema.   Skin:     General: Skin is warm and dry.   Neurological:      General: No focal deficit present.      Mental Status: She is alert.   Psychiatric:         Attention and Perception: Attention normal.         Mood and Affect: Mood and affect normal.         Behavior: Behavior normal.         No current outpatient medications on file.   Past Medical History:   Diagnosis Date    Allergy to gluten     Gastritis 2024    GDM (gestational diabetes mellitus)     GDM (gestational diabetes mellitus)     GERD (gastroesophageal reflux disease) Jan 2023    Gestational hypertension 3/3/2025    Raynaud's phenomenon      Allergies   Allergen Reactions    Amoxicillin Rash    Gluten Meal GI Intolerance    Ultrasound Gel [Gyne-Moistrin] Itching and Rash     Outline of EFM on belly for 2 weeks after having it applied                Result Review :     Common labs          3/3/2025    06:06 3/3/2025    16:32 4/10/2025    23:29 4/14/2025    11:12   Common Labs   Glucose 76   97  99    BUN 10   13  15    Creatinine 0.73   0.86  0.79    Sodium 133   138  132    Potassium 3.6   4.2  3.7    Chloride 99   102  99    Calcium 8.3   9.0  9.2    Albumin 2.9   3.7  3.9    Total Bilirubin <0.2   <0.2  0.3    Alkaline Phosphatase 254   128  94    AST (SGOT) 18   51  48    ALT (SGPT) 16   84  82    WBC 6.78  7.78  4.91  4.80    Hemoglobin 12.3  13.6  14.7  15.2    Hematocrit 38.7  42.7  45.0  45.9    Platelets 244  249  250  250         CT Abdomen Pelvis With Contrast  Result Date: 4/14/2025  Impression: 1.Minimal hydroureteronephrosis without etiology identified. Findings are similar if not slightly improved. 2.Nonspecific free fluid in the pelvis. 3.No  urinary bladder inflammatory changes are noted currently. No findings to suggest pyelonephritis. Electronically Signed: Robson Lassiter MD  4/14/2025 1:41 PM EDT  Workstation ID: [id number]    CT Abdomen Pelvis With Contrast  Result Date: 4/11/2025  1.  Possible age-indeterminate infectious/inflammatory cystitis. 2.  Bilateral ureteritis is possible, as well. 3.  There is mild-to-moderate hydronephrosis. Vesicoureteral reflux disease is possible. 4.  Probably no acute pyelonephritis. 5.  No obstructing ureteral calculus is identified. 6.  No other acute findings are seen in the abdomen or pelvis by enhanced CT examination. 7.  Please see above comments for further detail.   Portions of this note were completed with a voice recognition program.  4/11/2025 12:34 AM by Wiley Ewing MD on Workstation: [id number]               Social History     Tobacco Use   Smoking Status Never    Passive exposure: Never   Smokeless Tobacco Never           Assessment and Plan    Diagnoses and all orders for this visit:    1. Urinary frequency (Primary)  -     POCT urinalysis dipstick, automated    2. Low back pain without sciatica, unspecified back pain laterality, unspecified chronicity  -     POCT urinalysis dipstick, automated    3. Generalized abdominal pain  -     US Abdomen Complete; Future  -     H. Pylori Breath Test - Breath, Lung; Future        Follow Up    No follow-ups on file.  Patient was given instructions and counseling regarding her condition or for health maintenance advice. Please see specific information pulled into the AVS if appropriate.

## 2025-04-22 NOTE — TELEPHONE ENCOUNTER
Provider: JOURDAN READ    Caller: Taya Leonard    Relationship to Patient: Self      Reason for Call: PT WOULD LIKE TO CONFIRM IF THE FOLLOWING SYMPTOMS ARE RELATED TO HER DX HYDRONEPHROSIS.    SIDE PAIN AND BACK PAIN ON RIGHT, AND SOME PAIN UNDER THE RIB CAGE EVERY NOW AND THEN.      PT IS SCHEDULE FOR NEW PT APPT ON 4/29/25.     PT HAS SEEN HER PCP, AND RECENTLY HAD A BABY BUT WANTS TO TRY TO CONFIRM IF THIS IS RELATED.

## 2025-04-23 ENCOUNTER — TELEPHONE (OUTPATIENT)
Dept: FAMILY MEDICINE CLINIC | Age: 25
End: 2025-04-23
Payer: COMMERCIAL

## 2025-04-23 NOTE — TELEPHONE ENCOUNTER
Caller: Taya Leonard    Relationship: Self    Best call back number: [phone]    What is the best time to reach you: ANYTIME    Who are you requesting to speak with (clinical staff, provider,  specific staff member): NURSE     What was the call regarding: PATIENT WOULD LIKE A CALL BACK TO GO OVER HER LABS WHEN THEY ARE READY.

## 2025-04-24 ENCOUNTER — TELEPHONE (OUTPATIENT)
Age: 25
End: 2025-04-24

## 2025-04-24 NOTE — TELEPHONE ENCOUNTER
Provider: DR MCDONOUGH    Caller: Taya Leonard    Relationship to Patient: Self    Pharmacy: [email or web address]    Phone Number: [phone]    Reason for Call: PT HAD HER FIRST CYCLE SINCE DELIVERY AND AFTER IT ENDED SHE STARTED HAVING SOME SPOTTING SUNDAY AND MONDAY AND A YELLOW RUNNY FLUID AS WELL - PT IS UNSURE WHAT THAT WAS OR IF IT WAS NORMAL AND WOULD LIKE TO BE ADVISED   PT IS ALSO CONCERNED THAT SHE MAY HAVE HELLP SYNDROME SHE HAS EVERY SYMPTOM AND WOULD LIKE TO SPEAK TO SOMEONE ABOUT THAT PLEASE CALL

## 2025-04-24 NOTE — TELEPHONE ENCOUNTER
Pt reports some RUQ pain and headaches, Pt reports she hasn't taken her blood pressure but it was recently elevated at her last appointment, Notified patient to report to the ER with these symptoms and she agrees with understanding.

## 2025-04-25 PROBLEM — R35.0 URINARY FREQUENCY: Status: ACTIVE | Noted: 2025-04-25

## 2025-04-25 PROBLEM — M54.50 LOW BACK PAIN WITHOUT SCIATICA: Status: ACTIVE | Noted: 2025-04-25

## 2025-04-28 ENCOUNTER — TELEPHONE (OUTPATIENT)
Dept: FAMILY MEDICINE CLINIC | Age: 25
End: 2025-04-28
Payer: COMMERCIAL

## 2025-04-28 NOTE — TELEPHONE ENCOUNTER
Pt has not seen LJ for low Iron.  It appears that Claudia put in lab orders for her iron levels to be checked.  She saw LJ for abd pain

## 2025-04-28 NOTE — TELEPHONE ENCOUNTER
Pt had lab ordered by DT, she had not completed those, adv pt to complete lab orders before starting on an iron supplement over the counter

## 2025-04-28 NOTE — TELEPHONE ENCOUNTER
Caller: Taya Leonard    Relationship: Self    Best call back number: [phone]     What is the best time to reach you: ANYTIME    Who are you requesting to speak with (clinical staff, provider,  specific staff member): YOBANY CASH    Do you know the name of the person who called:     What was the call regardin WEEKS POST PARTUM, WOULD LIKE TO TALK TO YOBANY CASH ABOUT LOW IRON    Is it okay if the provider responds through MyChart: NO

## 2025-04-30 ENCOUNTER — RESULTS FOLLOW-UP (OUTPATIENT)
Dept: FAMILY MEDICINE CLINIC | Age: 25
End: 2025-04-30

## 2025-04-30 ENCOUNTER — OFFICE VISIT (OUTPATIENT)
Dept: FAMILY MEDICINE CLINIC | Age: 25
End: 2025-04-30
Payer: COMMERCIAL

## 2025-04-30 ENCOUNTER — LAB (OUTPATIENT)
Dept: LAB | Facility: HOSPITAL | Age: 25
End: 2025-04-30
Payer: COMMERCIAL

## 2025-04-30 VITALS
SYSTOLIC BLOOD PRESSURE: 125 MMHG | TEMPERATURE: 97.8 F | DIASTOLIC BLOOD PRESSURE: 79 MMHG | BODY MASS INDEX: 18.51 KG/M2 | HEIGHT: 64 IN | OXYGEN SATURATION: 100 % | HEART RATE: 108 BPM | WEIGHT: 108.4 LBS

## 2025-04-30 DIAGNOSIS — R06.02 SHORTNESS OF BREATH: ICD-10-CM

## 2025-04-30 DIAGNOSIS — K76.9 LIVER LESION: ICD-10-CM

## 2025-04-30 DIAGNOSIS — R07.9 CHEST PAIN, UNSPECIFIED TYPE: ICD-10-CM

## 2025-04-30 DIAGNOSIS — N13.30 HYDRONEPHROSIS, UNSPECIFIED HYDRONEPHROSIS TYPE: ICD-10-CM

## 2025-04-30 DIAGNOSIS — R74.8 ELEVATED LIVER ENZYMES: ICD-10-CM

## 2025-04-30 DIAGNOSIS — D64.9 ANEMIA, UNSPECIFIED TYPE: ICD-10-CM

## 2025-04-30 DIAGNOSIS — Z13.1 SCREENING FOR DIABETES MELLITUS: ICD-10-CM

## 2025-04-30 DIAGNOSIS — R30.0 DYSURIA: Primary | ICD-10-CM

## 2025-04-30 DIAGNOSIS — F41.8 POSTPARTUM ANXIETY: ICD-10-CM

## 2025-04-30 LAB
ALBUMIN SERPL-MCNC: 4.3 G/DL (ref 3.5–5.2)
ALBUMIN/GLOB SERPL: 1.3 G/DL
ALP SERPL-CCNC: 73 U/L (ref 39–117)
ALT SERPL W P-5'-P-CCNC: 44 U/L (ref 1–33)
ANION GAP SERPL CALCULATED.3IONS-SCNC: 12.9 MMOL/L (ref 5–15)
AST SERPL-CCNC: 27 U/L (ref 1–32)
BACTERIA UR QL AUTO: ABNORMAL /HPF
BASOPHILS # BLD AUTO: 0.02 10*3/MM3 (ref 0–0.2)
BASOPHILS NFR BLD AUTO: 0.5 % (ref 0–1.5)
BILIRUB SERPL-MCNC: 0.2 MG/DL (ref 0–1.2)
BILIRUB UR QL STRIP: NEGATIVE
BUN SERPL-MCNC: 7 MG/DL (ref 6–20)
BUN/CREAT SERPL: 8.3 (ref 7–25)
CALCIUM SPEC-SCNC: 9.5 MG/DL (ref 8.6–10.5)
CHLORIDE SERPL-SCNC: 102 MMOL/L (ref 98–107)
CLARITY UR: CLEAR
CO2 SERPL-SCNC: 25.1 MMOL/L (ref 22–29)
COLOR UR: ABNORMAL
CREAT SERPL-MCNC: 0.84 MG/DL (ref 0.57–1)
D DIMER PPP FEU-MCNC: <0.27 MCGFEU/ML (ref 0–0.5)
DEPRECATED RDW RBC AUTO: 50.8 FL (ref 37–54)
EGFRCR SERPLBLD CKD-EPI 2021: 98.4 ML/MIN/1.73
EOSINOPHIL # BLD AUTO: 0.09 10*3/MM3 (ref 0–0.4)
EOSINOPHIL NFR BLD AUTO: 2.2 % (ref 0.3–6.2)
ERYTHROCYTE [DISTWIDTH] IN BLOOD BY AUTOMATED COUNT: 15.4 % (ref 12.3–15.4)
EXPIRATION DATE: NORMAL
FLUAV AG UPPER RESP QL IA.RAPID: NOT DETECTED
FLUBV AG UPPER RESP QL IA.RAPID: NOT DETECTED
FOLATE SERPL-MCNC: >20 NG/ML (ref 4.78–24.2)
GLOBULIN UR ELPH-MCNC: 3.3 GM/DL
GLUCOSE SERPL-MCNC: 94 MG/DL (ref 65–99)
GLUCOSE UR STRIP-MCNC: NEGATIVE MG/DL
HBA1C MFR BLD: 5.2 % (ref 4.8–5.6)
HCT VFR BLD AUTO: 45.7 % (ref 34–46.6)
HGB BLD-MCNC: 14.6 G/DL (ref 12–15.9)
HGB UR QL STRIP.AUTO: ABNORMAL
IMM GRANULOCYTES # BLD AUTO: 0 10*3/MM3 (ref 0–0.05)
IMM GRANULOCYTES NFR BLD AUTO: 0 % (ref 0–0.5)
INTERNAL CONTROL: NORMAL
IRON 24H UR-MRATE: 54 MCG/DL (ref 37–145)
IRON SATN MFR SERPL: 9 % (ref 20–50)
KETONES UR QL STRIP: NEGATIVE
LEUKOCYTE ESTERASE UR QL STRIP.AUTO: NEGATIVE
LYMPHOCYTES # BLD AUTO: 1.74 10*3/MM3 (ref 0.7–3.1)
LYMPHOCYTES NFR BLD AUTO: 43.4 % (ref 19.6–45.3)
Lab: NORMAL
MCH RBC QN AUTO: 28.3 PG (ref 26.6–33)
MCHC RBC AUTO-ENTMCNC: 31.9 G/DL (ref 31.5–35.7)
MCV RBC AUTO: 88.7 FL (ref 79–97)
MONOCYTES # BLD AUTO: 0.27 10*3/MM3 (ref 0.1–0.9)
MONOCYTES NFR BLD AUTO: 6.7 % (ref 5–12)
NEUTROPHILS NFR BLD AUTO: 1.89 10*3/MM3 (ref 1.7–7)
NEUTROPHILS NFR BLD AUTO: 47.2 % (ref 42.7–76)
NITRITE UR QL STRIP: NEGATIVE
PH UR STRIP.AUTO: 6.5 [PH] (ref 5–8)
PLATELET # BLD AUTO: 291 10*3/MM3 (ref 140–450)
PMV BLD AUTO: 10.1 FL (ref 6–12)
POTASSIUM SERPL-SCNC: 3.5 MMOL/L (ref 3.5–5.2)
PROT SERPL-MCNC: 7.6 G/DL (ref 6–8.5)
PROT UR QL STRIP: NEGATIVE
RBC # BLD AUTO: 5.15 10*6/MM3 (ref 3.77–5.28)
RBC # UR STRIP: ABNORMAL /HPF
REF LAB TEST METHOD: ABNORMAL
SARS-COV-2 AG UPPER RESP QL IA.RAPID: NOT DETECTED
SODIUM SERPL-SCNC: 140 MMOL/L (ref 136–145)
SP GR UR STRIP: 1.01 (ref 1–1.03)
SQUAMOUS #/AREA URNS HPF: ABNORMAL /HPF
TIBC SERPL-MCNC: 605 MCG/DL (ref 298–536)
TRANSFERRIN SERPL-MCNC: 406 MG/DL (ref 200–360)
UROBILINOGEN UR QL STRIP: ABNORMAL
VIT B12 BLD-MCNC: 1030 PG/ML (ref 211–946)
WBC # UR STRIP: ABNORMAL /HPF
WBC NRBC COR # BLD AUTO: 4.01 10*3/MM3 (ref 3.4–10.8)

## 2025-04-30 PROCEDURE — 81001 URINALYSIS AUTO W/SCOPE: CPT

## 2025-04-30 PROCEDURE — 83036 HEMOGLOBIN GLYCOSYLATED A1C: CPT

## 2025-04-30 PROCEDURE — 87086 URINE CULTURE/COLONY COUNT: CPT

## 2025-04-30 PROCEDURE — 84466 ASSAY OF TRANSFERRIN: CPT

## 2025-04-30 PROCEDURE — 84443 ASSAY THYROID STIM HORMONE: CPT

## 2025-04-30 PROCEDURE — 85379 FIBRIN DEGRADATION QUANT: CPT

## 2025-04-30 PROCEDURE — 85025 COMPLETE CBC W/AUTO DIFF WBC: CPT

## 2025-04-30 PROCEDURE — 36415 COLL VENOUS BLD VENIPUNCTURE: CPT

## 2025-04-30 PROCEDURE — 82607 VITAMIN B-12: CPT

## 2025-04-30 PROCEDURE — 80053 COMPREHEN METABOLIC PANEL: CPT

## 2025-04-30 PROCEDURE — 82746 ASSAY OF FOLIC ACID SERUM: CPT

## 2025-04-30 PROCEDURE — 83540 ASSAY OF IRON: CPT

## 2025-04-30 RX ORDER — PRENATAL VIT/IRON FUM/FOLIC AC 27MG-0.8MG
TABLET ORAL DAILY
COMMUNITY

## 2025-04-30 NOTE — PROGRESS NOTES
Procedure     ECG 12 Lead    Date/Time: 4/30/2025 12:28 PM  Performed by: Chelsey Jansen MD    Authorized by: Suki Bhat APRN  Comparison: compared with previous ECG from 3/26/2024  Similar to previous ECG  Comparison to previous ECG: No change  Rhythm: sinus rhythm and sinus tachycardia  Rate: tachycardic  BPM: 102  Conduction: conduction normal  ST Segments: ST segments normal  T inversion: II, III and aVF (unchanged from 3/2024')  QRS axis: right  Other: no other findings  Comments: Borderline EKG unchanged from previous 1 year ago.  GUME

## 2025-04-30 NOTE — PROGRESS NOTES
Subjective     CHIEF COMPLAINT    Chief Complaint   Patient presents with    Shortness of Breath     X 6 days.     Dizziness     X 6 days. Off and on.     Headache    Urinary Tract Infection     Dysuria. Onset last night.      History of Present Illness:  Taya Leonard is a 24 y.o. female who presents to [facility] with multiple complaints. She is s/p vaginal delivery on 3/3/25 without complications. She complains of shortness of breath and dizziness, which has been present about a week. Intermittent chest pain and headaches are also noted. No recent fever, chills, cough or congestion. She has not recently traveled. Has not noticed any leg swelling.     She complains of dysuria. This began last night. Notes urinary frequency but has also been trying to drink a lot of water. Denies hematuria, flank pain or pelvic pain. Has some nausea and endorses alternating constipation and diarrhea.     She has been seen in both ER and our office over the last several weeks. She is postpartum, has had her follow up with OBGYN.     4/10/25 She went to [facility] ER, diagnosed with UTI. CT A/P showed cystitis, hydronephrosis. No acute pyelonephritis. She was treated with cefuroxime. Slight elevation in liver enzymes noted at this visit.     4/14/25 She returned to [facility] ER, CT scan showed similar findings, if not improved from previous CT scan.     4/18/25 She saw MICHAEL Pierce in our office for low back pain and recent UTI. CMP ordered to complete in 1 month, along with CBC/iron levels and hemoglobin A1C. Also ordered b12 and folate levels.       4/22/25 She saw MICHAEL Weathers in our office for abdominal and back pain. POCT urinalysis not concerning for infection. Abdominal ultrasound showed 1.5cm liver lesion with could reflect focal fat or hemangioma. Bilateral hydronephrosis not well seen on this ultrasound.     Patient had appointment with urology yesterday, but did not attend.      She is curious if her symptoms may be related to anxiety.       Review of Systems   Constitutional:  Negative for chills and fever.   Respiratory:  Positive for shortness of breath. Negative for cough and wheezing.    Cardiovascular:  Positive for chest pain. Negative for palpitations and leg swelling.   Gastrointestinal:  Positive for constipation, diarrhea and nausea. Negative for vomiting.   Genitourinary:  Positive for dysuria and frequency. Negative for urgency.   Psychiatric/Behavioral:  The patient is nervous/anxious.          Past Medical History:   Diagnosis Date    Allergy to gluten     Gastritis 2024    GDM (gestational diabetes mellitus)     GDM (gestational diabetes mellitus)     GERD (gastroesophageal reflux disease) Jan 2023    Gestational hypertension 3/3/2025    Raynaud's phenomenon          Past Surgical History:   Procedure Laterality Date    COLONOSCOPY  03/26/2024    COLONOSCOPY N/A 3/26/2024    Procedure: COLONOSCOPY;  Surgeon: Darwin Cid MD;  Location: formerly [facility];  Service: Gastroenterology;  Laterality: N/A;  NORMAL COLONOSCOPY    ENDOSCOPY  03/26/2024    ENDOSCOPY N/A 3/26/2024    Procedure: ESOPHAGOGASTRODUODENOSCOPY WITH BIOPSIES;  Surgeon: Darwin Cid MD;  Location: formerly [facility];  Service: Gastroenterology;  Laterality: N/A;  NORMAL ENDOSCOPY         Family History   Problem Relation Age of Onset    Prostate cancer Paternal Grandfather     Colon cancer Maternal Grandfather     Breast cancer Neg Hx     Ovarian cancer Neg Hx     Uterine cancer Neg Hx          Social History     Socioeconomic History    Marital status:      Spouse name: Mehran Parent    Number of children: 2   Tobacco Use    Smoking status: Never     Passive exposure: Never    Smokeless tobacco: Never   Vaping Use    Vaping status: Never Used   Substance and Sexual Activity    Alcohol use: Never    Drug use: Never    Sexual activity: Yes     Partners: Male     Birth  "control/protection: Condom         Allergies   Allergen Reactions    Amoxicillin Rash    Gluten Meal GI Intolerance    Ultrasound Gel [Gyne-Moistrin] Itching and Rash     Outline of EFM on belly for 2 weeks after having it applied           Current Outpatient Medications on File Prior to Visit   Medication Sig Dispense Refill    Prenatal Vit-Fe Fumarate-FA (prenatal vitamin 27-0.8) 27-0.8 MG tablet tablet Take  by mouth Daily.       No current facility-administered medications on file prior to visit.     /79 (BP Location: Left arm, Patient Position: Sitting)   Pulse 108   Temp 97.8 °F (36.6 °C) (Oral)   Ht 162.6 cm (64.02\")   Wt 49.2 kg (108 lb 6.4 oz)   LMP 04/15/2025 (Exact Date)   SpO2 100% Comment: room air  BMI 18.60 kg/m²       Objective     Physical Exam  Vitals and nursing note reviewed.   Constitutional:       General: She is not in acute distress.     Appearance: Normal appearance. She is not ill-appearing.   HENT:      Head: Normocephalic.      Mouth/Throat:      Lips: Pink.   Eyes:      Extraocular Movements: Extraocular movements intact.   Cardiovascular:      Rate and Rhythm: Regular rhythm. Tachycardia present.      Heart sounds: Normal heart sounds. No murmur heard.  Pulmonary:      Effort: Pulmonary effort is normal. No accessory muscle usage or respiratory distress.      Breath sounds: Normal breath sounds. No wheezing or rhonchi.   Abdominal:      General: There is no distension.      Palpations: Abdomen is soft.      Tenderness: There is no right CVA tenderness or left CVA tenderness.   Musculoskeletal:      Cervical back: Normal range of motion.   Skin:     General: Skin is warm and dry.      Capillary Refill: Capillary refill takes less than 2 seconds.   Neurological:      General: No focal deficit present.      Mental Status: She is alert and oriented to person, place, and time.      Cranial Nerves: No facial asymmetry.      Motor: No weakness.   Psychiatric:         Mood and " Affect: Affect normal. Mood is anxious.         Behavior: Behavior normal.         Assessment & Plan  Dysuria  Urine does not look suspicious for UTI at this time.  Trace bacteria is noted however there is squamous epithelial cells which could indicate contamination.  Will send urine for culture for further evaluation.  Orders:    Urinalysis With Microscopic - Urine, Clean Catch; Future    Urine Culture - Urine, Urine, Clean Catch; Future    Shortness of breath  EKG completed in office and reviewed with on call provider. No acute findings noted. Checking additional labs. Offered chest x ray but will defer for now. Lungs are clear on exam. Low suspicion for PE but will also check D dimer. Advised patient if d dimer elevated, will need to move forward with CT chest. Also has labs pending from a previous visit. COVID and flu testing is negative.   Orders:    POCT SARS-CoV-2 Antigen KATHRYN + Flu    ECG 12 Lead    Comprehensive metabolic panel; Future    D-dimer, Quantitative; Future    TSH Rfx On Abnormal To Free T4; Future    Chest pain, unspecified type    Orders:    ECG 12 Lead    Elevated liver enzymes    Orders:    Comprehensive metabolic panel; Future    Hydronephrosis, unspecified hydronephrosis type  Noted on recent CT scans. Rechecking CMP today. Had appointment with urology yesterday which she did not attend. Recommend keeping upcoming urology appointment.     Orders:    Comprehensive metabolic panel; Future    Postpartum anxiety  She notes anxiety and is curious if her symptoms may be related to this. Advised her that it is possible, but would like to rule out additional causes first. We will also schedule her a follow up with her PCP for further evaluation and management.        Liver lesion  Focal fat or hemangioma, follow up with PCP.             I spent 41 minutes caring for Taya on this date of service. This time includes time spent by me in the following activities: preparing for the visit, reviewing  tests, performing a medically appropriate examination and/or evaluation, counseling and educating the patient/family/caregiver, referring and communicating with other health care professionals, documenting information in the medical record, care coordination, and ordering test(s)        Follow up:  Return if symptoms worsen or fail to improve, for Next scheduled follow up.  Patient was given instructions and counseling regarding her condition or for health maintenance advice. Please see specific information pulled into the AVS if appropriate.

## 2025-05-01 ENCOUNTER — TELEPHONE (OUTPATIENT)
Dept: FAMILY MEDICINE CLINIC | Age: 25
End: 2025-05-01
Payer: COMMERCIAL

## 2025-05-01 LAB
BACTERIA SPEC AEROBE CULT: NO GROWTH
TSH SERPL DL<=0.05 MIU/L-ACNC: 1.55 UIU/ML (ref 0.27–4.2)

## 2025-05-01 NOTE — TELEPHONE ENCOUNTER
Caller: Taya Leonard    Relationship: Self    Best call back number: [phone]     Caller requesting test results: LABS     What test was performed: LABS     When was the test performed: 4.30.25    Where was the test performed:     Additional notes: PATIENT CALLED IN REQUESTING A CALL BACK AS SOON AS POSSIBLE FOR HER MOST RECENT TEST RESULTS THAT WERE DONE ON 4.30.25 PLEASE ADVISE        Spoke with dtr Lydia,pt lethargic after being medicated.Lydia knows her wishes and will implement them as her condition warrants.If she gets worse she knows she wants hospice and at that time will make her a DNR Spoke with dtr Lydia,pt lethargic after being medicated.Lydia knows her wishes and will implement them as her condition warrants.If she gets worse she knows she wants hospice and at that time will make her a DNR    met pt daughter, Lydia, hcp, pt lethargic on 100% NRB mask. discussed pt change in condition, molst reviewed: daughter agrees to dnr dni no TF comfort care no further labs or xrays, no RRT no Mews. discussed medication regime: regarding pt medical marijuana, oxycodone doses from home. Goal to have pt home w home hospice, [employer] work also present, issue w pt oxygen needs & capability of home O2 using NRB. Hospice nurse to come to see pt 1700 hrs tonite, daughter aware pt likely will not go home today, hope for tomorrow. also spoke that pt may die in the hospital, daughter hoping pt able to go home w goal to be home & able to have moments w family. daughter feels use of oxy and med marijuana will allow pt to achieve these goals, to breath easier , less pain. Dr Zuleta to complete molst form. PC will follow

## 2025-05-01 NOTE — TELEPHONE ENCOUNTER
HUB TO READ, called pt due to late cancel, left voicemail for call back, mailing pt a letter- 5/1/25 AB

## 2025-05-05 ENCOUNTER — TELEPHONE (OUTPATIENT)
Dept: FAMILY MEDICINE CLINIC | Age: 25
End: 2025-05-05
Payer: COMMERCIAL

## 2025-05-05 DIAGNOSIS — D64.9 ANEMIA, UNSPECIFIED TYPE: Primary | ICD-10-CM

## 2025-05-05 NOTE — TELEPHONE ENCOUNTER
Pt called regarding her iron sat labs, she stated she was previously taking ferrous sulf 325mg bid while pregnant and has stopped that, since her iron sat was low, she is wanting to know if she needs to go back on that, please adv.

## 2025-05-06 RX ORDER — FERROUS SULFATE 325(65) MG
325 TABLET ORAL
Qty: 90 TABLET | Refills: 1 | Status: SHIPPED | OUTPATIENT
Start: 2025-05-06

## 2025-05-06 NOTE — TELEPHONE ENCOUNTER
I really don't understand why it was scheduled with me, sounds like she has questions and this needs to be done with a provider and since she needs to establish with a provider for a PCP, then schedule with that person is best

## 2025-05-06 NOTE — TELEPHONE ENCOUNTER
Pt inf, rx sent for Feso4 325mg to [facility] pt adv to let pcp know at her upcoming appt 5/20/25 with pcp to order labs due in 4 mos

## 2025-05-06 NOTE — TELEPHONE ENCOUNTER
Yes , take once daily and she will need to repeat her labs in about 4 mo, Iron, TIBC, CBC, b12 and folate.

## 2025-05-06 NOTE — TELEPHONE ENCOUNTER
Went over labs with pt yesterday, she was asking about her iron supplements, I did a telephone encounter and sent it to you, please adv

## 2025-05-06 NOTE — TELEPHONE ENCOUNTER
I have seen her twice, last visit was , I cannot address labs without her being seen... so someone needs to see her

## 2025-05-19 ENCOUNTER — TELEPHONE (OUTPATIENT)
Dept: FAMILY MEDICINE CLINIC | Age: 25
End: 2025-05-19
Payer: COMMERCIAL

## 2025-05-19 NOTE — TELEPHONE ENCOUNTER
Pt calling for appt stating she is dizzy,having chest pains, and shortness of breath and her chest pain is getting worse, adv pt to go to ED for cardiac workup asap.

## 2025-05-22 ENCOUNTER — HOSPITAL ENCOUNTER (EMERGENCY)
Facility: HOSPITAL | Age: 25
Discharge: HOME | End: 2025-05-22
Attending: EMERGENCY MEDICINE
Payer: COMMERCIAL

## 2025-05-22 ENCOUNTER — APPOINTMENT (OUTPATIENT)
Dept: GENERAL RADIOLOGY | Facility: HOSPITAL | Age: 25
End: 2025-05-22
Payer: COMMERCIAL

## 2025-05-22 VITALS
OXYGEN SATURATION: 100 % | RESPIRATION RATE: 18 BRPM | HEIGHT: 64 IN | WEIGHT: 108.03 LBS | TEMPERATURE: 98.2 F | DIASTOLIC BLOOD PRESSURE: 95 MMHG | SYSTOLIC BLOOD PRESSURE: 140 MMHG | BODY MASS INDEX: 18.44 KG/M2 | HEART RATE: 80 BPM

## 2025-05-22 DIAGNOSIS — M94.0 ACUTE COSTOCHONDRITIS: Primary | ICD-10-CM

## 2025-05-22 LAB
ALBUMIN SERPL-MCNC: 4.5 G/DL (ref 3.5–5.2)
ALBUMIN/GLOB SERPL: 1.3 G/DL
ALP SERPL-CCNC: 76 U/L (ref 39–117)
ALT SERPL W P-5'-P-CCNC: 62 U/L (ref 1–33)
ANION GAP SERPL CALCULATED.3IONS-SCNC: 14 MMOL/L (ref 5–15)
AST SERPL-CCNC: 35 U/L (ref 1–32)
BASOPHILS # BLD AUTO: 0.03 10*3/MM3 (ref 0–0.2)
BASOPHILS NFR BLD AUTO: 0.7 % (ref 0–1.5)
BILIRUB SERPL-MCNC: 0.3 MG/DL (ref 0–1.2)
BILIRUB UR QL STRIP: NEGATIVE
BUN SERPL-MCNC: 10 MG/DL (ref 6–20)
BUN/CREAT SERPL: 12 (ref 7–25)
CALCIUM SPEC-SCNC: 9.7 MG/DL (ref 8.6–10.5)
CHLORIDE SERPL-SCNC: 103 MMOL/L (ref 98–107)
CLARITY UR: CLEAR
CO2 SERPL-SCNC: 22 MMOL/L (ref 22–29)
COLOR UR: YELLOW
CREAT SERPL-MCNC: 0.83 MG/DL (ref 0.57–1)
CREAT UR-MCNC: 38.9 MG/DL
DEPRECATED RDW RBC AUTO: 45.3 FL (ref 37–54)
EGFRCR SERPLBLD CKD-EPI 2021: 99.9 ML/MIN/1.73
EOSINOPHIL # BLD AUTO: 0.08 10*3/MM3 (ref 0–0.4)
EOSINOPHIL NFR BLD AUTO: 1.8 % (ref 0.3–6.2)
ERYTHROCYTE [DISTWIDTH] IN BLOOD BY AUTOMATED COUNT: 14.5 % (ref 12.3–15.4)
FLUAV RNA RESP QL NAA+PROBE: NOT DETECTED
FLUBV RNA RESP QL NAA+PROBE: NOT DETECTED
GLOBULIN UR ELPH-MCNC: 3.6 GM/DL
GLUCOSE SERPL-MCNC: 104 MG/DL (ref 65–99)
GLUCOSE UR STRIP-MCNC: NEGATIVE MG/DL
HCG INTACT+B SERPL-ACNC: <0.5 MIU/ML
HCT VFR BLD AUTO: 46.8 % (ref 34–46.6)
HGB BLD-MCNC: 15.3 G/DL (ref 12–15.9)
HGB UR QL STRIP.AUTO: NEGATIVE
HOLD SPECIMEN: NORMAL
HOLD SPECIMEN: NORMAL
IMM GRANULOCYTES # BLD AUTO: 0 10*3/MM3 (ref 0–0.05)
IMM GRANULOCYTES NFR BLD AUTO: 0 % (ref 0–0.5)
KETONES UR QL STRIP: NEGATIVE
LEUKOCYTE ESTERASE UR QL STRIP.AUTO: NEGATIVE
LIPASE SERPL-CCNC: 29 U/L (ref 13–60)
LYMPHOCYTES # BLD AUTO: 1.85 10*3/MM3 (ref 0.7–3.1)
LYMPHOCYTES NFR BLD AUTO: 42.6 % (ref 19.6–45.3)
MAGNESIUM SERPL-MCNC: 2.1 MG/DL (ref 1.6–2.6)
MCH RBC QN AUTO: 28.7 PG (ref 26.6–33)
MCHC RBC AUTO-ENTMCNC: 32.7 G/DL (ref 31.5–35.7)
MCV RBC AUTO: 87.6 FL (ref 79–97)
MONOCYTES # BLD AUTO: 0.36 10*3/MM3 (ref 0.1–0.9)
MONOCYTES NFR BLD AUTO: 8.3 % (ref 5–12)
NEUTROPHILS NFR BLD AUTO: 2.02 10*3/MM3 (ref 1.7–7)
NEUTROPHILS NFR BLD AUTO: 46.6 % (ref 42.7–76)
NITRITE UR QL STRIP: NEGATIVE
NRBC BLD AUTO-RTO: 0 /100 WBC (ref 0–0.2)
NT-PROBNP SERPL-MCNC: 62.6 PG/ML (ref 0–450)
PH UR STRIP.AUTO: 6.5 [PH] (ref 5–8)
PLATELET # BLD AUTO: 350 10*3/MM3 (ref 140–450)
PMV BLD AUTO: 9.9 FL (ref 6–12)
POTASSIUM SERPL-SCNC: 4.4 MMOL/L (ref 3.5–5.2)
PROT ?TM UR-MCNC: 10.8 MG/DL
PROT SERPL-MCNC: 8.1 G/DL (ref 6–8.5)
PROT UR QL STRIP: NEGATIVE
PROT/CREAT UR: 0.28 MG/G{CREAT}
QT INTERVAL: 369 MS
QTC INTERVAL: 440 MS
RBC # BLD AUTO: 5.34 10*6/MM3 (ref 3.77–5.28)
RSV RNA RESP QL NAA+PROBE: NOT DETECTED
S PYO AG THROAT QL: NEGATIVE
SARS-COV-2 RNA RESP QL NAA+PROBE: NOT DETECTED
SODIUM SERPL-SCNC: 139 MMOL/L (ref 136–145)
SP GR UR STRIP: <=1.005 (ref 1–1.03)
TROPONIN T SERPL HS-MCNC: <6 NG/L
UROBILINOGEN UR QL STRIP: NORMAL
WBC NRBC COR # BLD AUTO: 4.34 10*3/MM3 (ref 3.4–10.8)
WHOLE BLOOD HOLD COAG: NORMAL
WHOLE BLOOD HOLD SPECIMEN: NORMAL

## 2025-05-22 PROCEDURE — 81003 URINALYSIS AUTO W/O SCOPE: CPT | Performed by: EMERGENCY MEDICINE

## 2025-05-22 PROCEDURE — 87880 STREP A ASSAY W/OPTIC: CPT

## 2025-05-22 PROCEDURE — 83735 ASSAY OF MAGNESIUM: CPT | Performed by: EMERGENCY MEDICINE

## 2025-05-22 PROCEDURE — 84702 CHORIONIC GONADOTROPIN TEST: CPT

## 2025-05-22 PROCEDURE — 87081 CULTURE SCREEN ONLY: CPT

## 2025-05-22 PROCEDURE — 87637 SARSCOV2&INF A&B&RSV AMP PRB: CPT

## 2025-05-22 PROCEDURE — 36415 COLL VENOUS BLD VENIPUNCTURE: CPT

## 2025-05-22 PROCEDURE — 85025 COMPLETE CBC W/AUTO DIFF WBC: CPT | Performed by: EMERGENCY MEDICINE

## 2025-05-22 PROCEDURE — 84156 ASSAY OF PROTEIN URINE: CPT | Performed by: EMERGENCY MEDICINE

## 2025-05-22 PROCEDURE — 99284 EMERGENCY DEPT VISIT MOD MDM: CPT

## 2025-05-22 PROCEDURE — 93005 ELECTROCARDIOGRAM TRACING: CPT

## 2025-05-22 PROCEDURE — 80053 COMPREHEN METABOLIC PANEL: CPT | Performed by: EMERGENCY MEDICINE

## 2025-05-22 PROCEDURE — 83880 ASSAY OF NATRIURETIC PEPTIDE: CPT | Performed by: EMERGENCY MEDICINE

## 2025-05-22 PROCEDURE — 93005 ELECTROCARDIOGRAM TRACING: CPT | Performed by: EMERGENCY MEDICINE

## 2025-05-22 PROCEDURE — 84484 ASSAY OF TROPONIN QUANT: CPT | Performed by: EMERGENCY MEDICINE

## 2025-05-22 PROCEDURE — 71045 X-RAY EXAM CHEST 1 VIEW: CPT

## 2025-05-22 PROCEDURE — 83690 ASSAY OF LIPASE: CPT | Performed by: EMERGENCY MEDICINE

## 2025-05-22 PROCEDURE — 82570 ASSAY OF URINE CREATININE: CPT | Performed by: EMERGENCY MEDICINE

## 2025-05-22 RX ORDER — ASPIRIN 81 MG/1
324 TABLET, CHEWABLE ORAL ONCE
Status: DISCONTINUED | OUTPATIENT
Start: 2025-05-22 | End: 2025-05-22 | Stop reason: HOSPADM

## 2025-05-22 RX ORDER — SODIUM CHLORIDE 0.9 % (FLUSH) 0.9 %
10 SYRINGE (ML) INJECTION AS NEEDED
Status: DISCONTINUED | OUTPATIENT
Start: 2025-05-22 | End: 2025-05-22 | Stop reason: HOSPADM

## 2025-05-22 NOTE — ED PROVIDER NOTES
Time: 5:24 PM EDT  Date of encounter:  5/22/2025  Independent Historian/Clinical History and Information was obtained by:   Patient    History is limited by: N/A    Chief complaint: Chest pain    History of Present Illness:  Patient is a 24 y.o. year old female who presents to the emergency department for evaluation of chest pain.  Patient reports symptoms began last Thursday or Friday.  Patient points to her midsternal area.  She describes as a pressure sensation.  Patient states the pain hurts to breathe.  The patient denies any actual shortness of breath.  Patient also states that today she felt a lump along her sternum area and was concerned.  Patient denies cough.  Patient denies fevers.  Patient denies any calf pain or swelling.       Patient Care Team  Primary Care Provider: Provider, No Known    Past Medical History:     Allergies   Allergen Reactions    Amoxicillin Rash    Gluten Meal GI Intolerance    Ultrasound Gel [Gyne-Moistrin] Itching and Rash     Outline of EFM on belly for 2 weeks after having it applied      Past Medical History:   Diagnosis Date    Allergy to gluten     Gastritis 2024    GDM (gestational diabetes mellitus)     GDM (gestational diabetes mellitus)     GERD (gastroesophageal reflux disease) Jan 2023    Gestational hypertension 3/3/2025    Raynaud's phenomenon      Past Surgical History:   Procedure Laterality Date    COLONOSCOPY  03/26/2024    COLONOSCOPY N/A 3/26/2024    Procedure: COLONOSCOPY;  Surgeon: Darwin Cid MD;  Location: [facility] ENDOSCOPY;  Service: Gastroenterology;  Laterality: N/A;  NORMAL COLONOSCOPY    ENDOSCOPY  03/26/2024    ENDOSCOPY N/A 3/26/2024    Procedure: ESOPHAGOGASTRODUODENOSCOPY WITH BIOPSIES;  Surgeon: Darwin Cid MD;  Location: [facility] ENDOSCOPY;  Service: Gastroenterology;  Laterality: N/A;  NORMAL ENDOSCOPY     Family History   Problem Relation Age of Onset    Prostate cancer Paternal Grandfather     Colon cancer Maternal  "Grandfather     Breast cancer Neg Hx     Ovarian cancer Neg Hx     Uterine cancer Neg Hx        Home Medications:  Prior to Admission medications    Medication Sig Start Date End Date Taking? Authorizing Provider   ferrous sulfate 325 (65 FE) MG tablet Take 1 tablet by mouth Daily With Breakfast. 5/6/25   Claudia Parker APRN   Prenatal Vit-Fe Fumarate-FA (prenatal vitamin 27-0.8) 27-0.8 MG tablet tablet Take  by mouth Daily.    Provider, Historical, MD        Social History:   Social History     Tobacco Use    Smoking status: Never     Passive exposure: Never    Smokeless tobacco: Never   Vaping Use    Vaping status: Never Used   Substance Use Topics    Alcohol use: Never    Drug use: Never         Review of Systems:  Review of Systems   Constitutional:  Negative for chills and fever.   HENT:  Negative for congestion, ear pain and sore throat.    Eyes:  Negative for pain.   Respiratory:  Negative for cough, chest tightness and shortness of breath.    Cardiovascular:  Positive for chest pain.   Gastrointestinal:  Negative for abdominal pain, diarrhea, nausea and vomiting.   Genitourinary:  Negative for flank pain and hematuria.   Musculoskeletal:  Negative for joint swelling.   Skin:  Negative for pallor.   Neurological:  Negative for seizures and headaches.   All other systems reviewed and are negative.       Physical Exam:  /76 (BP Location: Right arm, Patient Position: Sitting)   Pulse 76   Temp 97.9 °F (36.6 °C) (Oral)   Resp 20   Ht 162.6 cm (64\")   Wt 49 kg (108 lb 0.4 oz)   LMP 05/15/2025   SpO2 100%   Breastfeeding No   BMI 18.54 kg/m²   Vital signs were reviewed under triage note.  General appearance - Patient appears well-developed and well-nourished.  Patient is in no acute distress.  Head - Normocephalic, atraumatic.  Pupils - Equal, round, reactive to light.  Extraocular muscles are intact.  Conjunctiva is clear.  Nasal - Normal inspection.  No evidence of trauma or " epistaxis.  Tympanic membranes - Gray, intact without erythema or retractions.  Oral mucosa - Pink and moist without lesions or erythema.  Uvula is midline.  Chest wall - Atraumatic.  Chest wall has reproducible tenderness with palpation along the upper sternal border.  There are no vesicular rashes noted.  Neck - Supple.  Trachea was midline.  There is no palpable lymphadenopathy or thyromegaly.  There are no meningeal signs  Lungs - Clear to auscultation and percussion bilaterally.  Heart - Regular rate and rhythm without any murmurs, clicks, or gallops.  Abdomen - Soft.  Bowel sounds are present.  There is no palpable tenderness.  There is no rebound, guarding, or rigidity.  There are no palpable masses.  There are no pulsatile masses.  Back - Spine is straight and midline.  There is no CVA tenderness.  Extremities - Intact x4 with full range of motion.  There is no palpable edema.  Pulses are intact x4 and equal.  Neurologic - Patient is awake, alert, and oriented x3.  Cranial nerves II through XII are grossly intact.  Motor and sensory functions grossly intact.  Cerebellar function was normal.  Integument - There are no rashes.  There are no petechia or purpura lesions noted.  There are no vesicular lesions noted.            Procedures:  Procedures      Medical Decision Making:      Comorbidities that affect care:    Raynaud's, GERD, gastritis, gestational diabetes, gestational hypertension    External Notes reviewed:    Previous Clinic Note: Office visit with MICHAEL Tillman on 4/30/2025 was reviewed by me.      The following orders were placed and all results were independently analyzed by me:  Orders Placed This Encounter   Procedures    COVID PRE-OP / PRE-PROCEDURE SCREENING ORDER (NO ISOLATION) - Swab, Nasopharynx    Rapid Strep A Screen - Swab, Throat    COVID-19, FLU A/B, RSV PCR 1 HR TAT - Swab, Nasopharynx    Beta Strep Culture, Throat - Swab, Throat    XR Chest 1 View    Candor Draw    High  Sensitivity Troponin T    Comprehensive Metabolic Panel    Lipase    BNP    Magnesium    CBC Auto Differential    hCG, Quantitative, Pregnancy    Protein / Creatinine Ratio, Urine - Urine, Clean Catch    Urinalysis With Microscopic If Indicated (No Culture) - Urine, Clean Catch    NPO Diet NPO Type: Strict NPO    Undress & Gown    Continuous Pulse Oximetry    Oxygen Therapy- Nasal Cannula; Titrate 1-6 LPM Per SpO2; 90 - 95%    ECG 12 Lead ED Triage Standing Order; Chest Pain    ECG 12 Lead ED Triage Standing Order; Chest Pain    Insert Peripheral IV    CBC & Differential    Green Top (Gel)    Lavender Top    Gold Top - SST    Light Blue Top       Medications Given in the Emergency Department:  Medications   sodium chloride 0.9 % flush 10 mL (has no administration in time range)   aspirin chewable tablet 324 mg (324 mg Oral Not Given 5/22/25 1809)        ED Course:    The patient was initially evaluated in the triage area where orders were placed. The patient was later dispositioned by Arturo Byrne DO.      The patient was advised to stay for completion of workup which includes but is not limited to communication of labs and radiological results, reassessment and plan. The patient was advised that leaving prior to disposition by a provider could result in critical findings that are not communicated to the patient.     ED Course as of 05/23/25 1356   Fri May 23, 2025   1355 EKG performed at 1658 was interpreted by me to show a normal sinus rhythm with ventricular rate of 85 bpm.  The WA interval is 127 ms.  P waves were normal.  QRS intervals normal.  Axis was at 95 degrees.  There was no acute ischemic ST or T wave changes identified.  QT corrected was 440 ms. [TB]      ED Course User Index  [TB] Arturo Byrne DO   The patient was seen and evaluated in the ED by me.  The above history and physical examination was performed as documented.  Diagnostic data was obtained.  Results reviewed.  Findings were discussed with the  patient.  ED workup was unremarkable.  I feel her pain is more costochondritis in nature.  Patient had a negative PERC score.  Patient stable for discharge home with outpatient treatment follow-up.    Labs:    Lab Results (last 24 hours)       Procedure Component Value Units Date/Time    High Sensitivity Troponin T [[id number]]  (Normal) Collected: 05/22/25 1715    Specimen: Blood Updated: 05/22/25 1750     HS Troponin T <6 ng/L     Narrative:      High Sensitive Troponin T Reference Range:  <14.0 ng/L- Negative Female for AMI  <22.0 ng/L- Negative Male for AMI  >=14 - Abnormal Female indicating possible myocardial injury.  >=22 - Abnormal Male indicating possible myocardial injury.   Clinicians would have to utilize clinical acumen, EKG, Troponin, and serial changes to determine if it is an Acute Myocardial Infarction or myocardial injury due to an underlying chronic condition.         CBC & Differential [[id number]]  (Abnormal) Collected: 05/22/25 1715    Specimen: Blood Updated: 05/22/25 1724    Narrative:      The following orders were created for panel order CBC & Differential.  Procedure                               Abnormality         Status                     ---------                               -----------         ------                     CBC Auto Differential[[id number]]        Abnormal            Final result                 Please view results for these tests on the individual orders.    Comprehensive Metabolic Panel [[id number]]  (Abnormal) Collected: 05/22/25 1715    Specimen: Blood Updated: 05/22/25 1752     Glucose 104 mg/dL      BUN 10 mg/dL      Creatinine 0.83 mg/dL      Sodium 139 mmol/L      Potassium 4.4 mmol/L      Comment: Slight hemolysis detected by analyzer. Result may be falsely elevated.        Chloride 103 mmol/L      CO2 22.0 mmol/L      Calcium 9.7 mg/dL      Total Protein 8.1 g/dL      Albumin 4.5 g/dL      ALT (SGPT) 62 U/L      AST (SGOT) 35 U/L      Comment: Slight hemolysis  detected by analyzer. Result may be falsely elevated.        Alkaline Phosphatase 76 U/L      Total Bilirubin 0.3 mg/dL      Globulin 3.6 gm/dL      A/G Ratio 1.3 g/dL      BUN/Creatinine Ratio 12.0     Anion Gap 14.0 mmol/L      eGFR 99.9 mL/min/1.73     Narrative:      GFR Categories in Chronic Kidney Disease (CKD)              GFR Category          GFR (mL/min/1.73)    Interpretation  G1                    90 or greater        Normal or high (1)  G2                    60-89                Mild decrease (1)  G3a                   45-59                Mild to moderate decrease  G3b                   30-44                Moderate to severe decrease  G4                    15-29                Severe decrease  G5                    14 or less           Kidney failure    (1)In the absence of evidence of kidney disease, neither GFR category G1 or G2 fulfill the criteria for CKD.    eGFR calculation 2021 CKD-EPI creatinine equation, which does not include race as a factor    Lipase [[id number]]  (Normal) Collected: 05/22/25 1715    Specimen: Blood Updated: 05/22/25 1750     Lipase 29 U/L     BNP [[id number]]  (Normal) Collected: 05/22/25 1715    Specimen: Blood Updated: 05/22/25 1746     proBNP 62.6 pg/mL     Narrative:      This assay is used as an aid in the diagnosis of individuals suspected of having heart failure. It can be used as an aid in the diagnosis of acute decompensated heart failure (ADHF) in patients presenting with signs and symptoms of ADHF to the emergency department (ED). In addition, NT-proBNP of <300 pg/mL indicates ADHF is not likely.    Age Range Result Interpretation  NT-proBNP Concentration (pg/mL:      <50             Positive            >450                   Gray                 300-450                    Negative             <300    50-75           Positive            >900                  Gray                300-900                  Negative            <300      >75             Positive             >1800                  Gray                300-1800                  Negative            <300    Magnesium [[id number]]  (Normal) Collected: 05/22/25 1715    Specimen: Blood Updated: 05/22/25 1752     Magnesium 2.1 mg/dL     CBC Auto Differential [[id number]]  (Abnormal) Collected: 05/22/25 1715    Specimen: Blood Updated: 05/22/25 1724     WBC 4.34 10*3/mm3      RBC 5.34 10*6/mm3      Hemoglobin 15.3 g/dL      Hematocrit 46.8 %      MCV 87.6 fL      MCH 28.7 pg      MCHC 32.7 g/dL      RDW 14.5 %      RDW-SD 45.3 fl      MPV 9.9 fL      Platelets 350 10*3/mm3      Neutrophil % 46.6 %      Lymphocyte % 42.6 %      Monocyte % 8.3 %      Eosinophil % 1.8 %      Basophil % 0.7 %      Immature Grans % 0.0 %      Neutrophils, Absolute 2.02 10*3/mm3      Lymphocytes, Absolute 1.85 10*3/mm3      Monocytes, Absolute 0.36 10*3/mm3      Eosinophils, Absolute 0.08 10*3/mm3      Basophils, Absolute 0.03 10*3/mm3      Immature Grans, Absolute 0.00 10*3/mm3      nRBC 0.0 /100 WBC     hCG, Quantitative, Pregnancy [[id number]] Collected: 05/22/25 1715    Specimen: Blood Updated: 05/22/25 1750     HCG Quantitative <0.50 mIU/mL     Narrative:      HCG Ranges by Gestational Age    Females - non-pregnant premenopausal   </= 1mIU/mL HCG  Females - postmenopausal               </= 7mIU/mL HCG    3 Weeks       5.4   -      72 mIU/mL  4 Weeks      10.2   -     708 mIU/mL  5 Weeks       217   -   8,245 mIU/mL  6 Weeks       152   -  32,177 mIU/mL  7 Weeks     4,059   - 153,767 mIU/mL  8 Weeks    31,366   - 149,094 mIU/mL  9 Weeks    59,109   - 135,901 mIU/mL  10 Weeks   44,186   - 170,409 mIU/mL  12 Weeks   27,107   - 201,615 mIU/mL  14 Weeks   24,302   -  93,646 mIU/mL  15 Weeks   12,540   -  69,747 mIU/mL  16 Weeks    8,904   -  55,332 mIU/mL  17 Weeks    8,240   -  51,793 mIU/mL  18 Weeks    9,649   -  55,271 mIU/mL      COVID PRE-OP / PRE-PROCEDURE SCREENING ORDER (NO ISOLATION) - Swab, Nasopharynx [[id number]]  (Normal) Collected:  05/22/25 1729    Specimen: Swab from Nasopharynx Updated: 05/22/25 1819    Narrative:      The following orders were created for panel order COVID PRE-OP / PRE-PROCEDURE SCREENING ORDER (NO ISOLATION) - Swab, Nasopharynx.  Procedure                               Abnormality         Status                     ---------                               -----------         ------                     COVID-19, FLU A/B, RSV P...[[id number]]  Normal              Final result                 Please view results for these tests on the individual orders.    Rapid Strep A Screen - Swab, Throat [[id number]]  (Normal) Collected: 05/22/25 1729    Specimen: Swab from Throat Updated: 05/22/25 1749     Strep A Ag Negative    COVID-19, FLU A/B, RSV PCR 1 HR TAT - Swab, Nasopharynx [[id number]]  (Normal) Collected: 05/22/25 1729    Specimen: Swab from Nasopharynx Updated: 05/22/25 1819     COVID19 Not Detected     Influenza A PCR Not Detected     Influenza B PCR Not Detected     RSV, PCR Not Detected    Narrative:      Fact sheet for providers: https://www.fda.gov/media/378300/download    Fact sheet for patients: https://www.fda.gov/media/206791/download    Test performed by PCR.    Beta Strep Culture, Throat - Swab, Throat [[id number]] Collected: 05/22/25 1729    Specimen: Swab from Throat Updated: 05/22/25 1749    Protein / Creatinine Ratio, Urine - Urine, Clean Catch [[id number]] Collected: 05/22/25 1949    Specimen: Urine, Clean Catch Updated: 05/22/25 2014     Creatinine, Urine 38.9 mg/dL      Total Protein, Urine 10.8 mg/dL      Protein/Creatinine Ratio, Urine 0.28    Urinalysis With Microscopic If Indicated (No Culture) - Urine, Clean Catch [[id number]]  (Normal) Collected: 05/22/25 1949    Specimen: Urine, Clean Catch Updated: 05/22/25 2006     Color, UA Yellow     Appearance, UA Clear     pH, UA 6.5     Specific Gravity, UA <=1.005     Glucose, UA Negative     Ketones, UA Negative     Bilirubin, UA Negative     Blood, UA Negative      Protein, UA Negative     Leuk Esterase, UA Negative     Nitrite, UA Negative     Urobilinogen, UA 0.2 E.U./dL    Narrative:      Urine microscopic not indicated.             Imaging:    XR Chest 1 View  Result Date: 5/22/2025  XR CHEST 1 VW Date of Exam: 5/22/2025 5:16 PM EDT Indication: Chest Pain Triage Protocol Comparison: Chest radiograph 5/23/2017 Findings: Mediastinum: Cardiac silhouette appears unchanged and normal Lungs: The lungs appear clear without focal consolidation appreciated. Pleura: No pleural effusion or pneumothorax. Bones and soft tissues: No acute, displaced fracture seen.     Impression: No radiographic evidence of acute cardiopulmonary abnormality. Electronically Signed: Hima Mcgee MD  5/22/2025 5:27 PM EDT  Workstation ID: [id number]        Differential Diagnosis and Discussion:      Chest Pain:  Based on the patient's signs and symptoms, I considered aortic dissection, myocardial infaction, pulmonary embolism, cardiac tamponade, pericarditis, pneumothorax, musculoskeletal chest pain and other differential diagnosis as an etiology of the patient's chest pain.     Labs were collected in the emergency department and all labs were reviewed and interpreted by me.  X-ray were performed in the emergency department and all X-ray impressions were independently interpreted by me.  An EKG was performed and the EKG was interpreted by me.    MDM     Amount and/or Complexity of Data Reviewed  Decide to obtain previous medical records or to obtain history from someone other than the patient: yes                     Patient Care Considerations:    PERC: I used the PERC score to risk stratify the patient for PE and a CT of the chest was considered but ultimately not indicated in today's visit.      Consultants/Shared Management Plan:    None    Social Determinants of Health:    Patient is independent, reliable, and has access to care.       Disposition and Care Coordination:    Discharged: The patient is  suitable and stable for discharge with no need for consideration of admission.    I have explained the patient´s condition, diagnoses and treatment plan based on the information available to me at this time. I have answered questions and addressed any concerns. The patient has a good  understanding of the patient´s diagnosis, condition, and treatment plan as can be expected at this point. The vital signs have been stable. The patient´s condition is stable and appropriate for discharge from the emergency department.      The patient will pursue further outpatient evaluation with the primary care physician or other designated or consulting physician as outlined in the discharge instructions. They are agreeable to this plan of care and follow-up instructions have been explained in detail. The patient has received these instructions in written format and has expressed an understanding of the discharge instructions. The patient is aware that any significant change in condition or worsening of symptoms should prompt an immediate return to this or the closest emergency department or call to 911.  I have explained discharge medications and the need for follow up with the patient/caretakers. This was also printed in the discharge instructions. Patient was discharged with the following medications and follow up:      Medication List      No changes were made to your prescriptions during this visit.        Follow-up with your primary care provider in 1 week if symptoms or not improving.          Final diagnoses:   Acute costochondritis        ED Disposition       ED Disposition   Discharge    Condition   Stable    Comment   --               This medical record created using voice recognition software.             Arturo Byrne DO  05/23/25 1702

## 2025-05-23 NOTE — DISCHARGE INSTRUCTIONS
Activity as tolerated.  Avoid any strenuous activities until symptoms have resolved.  Take Tylenol and/or ibuprofen for pain.  Follow-up with your primary care provider in 1 week if symptoms or not improving.  Return to the ER for change or worsening pain, increasing shortness of breath, fever greater than 101, or any other concerns issues that may arise.

## 2025-05-24 LAB — BACTERIA SPEC AEROBE CULT: NORMAL

## 2025-05-27 NOTE — PROGRESS NOTES
Chief Complaint: new patient and hydronephrosis    Patient or patient representative verbalized consent for the use of Ambient Listening during the visit with  MICHAEL Cruz for chart documentation. 2025  11:25 EDT    Subjective         History of Present Illness  Taya Leonard is a 24 y.o. female presents to [facility] UROLOG[facility] to be seen for hydronephrosis.    The patient was seen at [facility] ED on 2025 with complaints of abdominal and flank pain.  CT scan of the abdomen pelvis at that visit showed minimal bilateral hydroureteronephrosis, right worse than left.  Repeat ultrasound of the abdomen completed on 2025 reported that the hydronephrosis was not noted to the right kidney and there was no significant hydronephrosis noted to the left kidney.    She sought emergency care on 04/10/2025 due to abdominal pain, which was subsequently diagnosed as a bladder infection. She was prescribed antibiotics, which have since alleviated her symptoms. An abdominal ultrasound conducted by her primary care physician indicated an improvement in her hydronephrosis. She reports no current dysuria, pain or discomfort. She has no history of urinary issues, urinary reflux, or recurrent urinary tract infections.  She does report that she has a urinary tract infection or 2 while pregnant.  She recalls a previous bladder infection during her last pregnancy and a potential kidney enlargement, which was deemed normal for her pregnant state. She reports no signs of infection, pain, burning sensation during urination, or blood in her urine. She has been increasing her water intake, resulting in more frequent bathroom visits, but not to an abnormal point.  She denies urinary incontinence.  She typically wakes up twice at night to urinate, but reports she is already up caring for her  child and so she often times just goes to the bathroom when she is already up caring for him.  She has no history of urological surgeries.  She denies any significant past medical history.  She does not use tobacco. She has a history of kidney stone at the age of 16, which she was able to pass without surgical intervention.  The patient is approximately 3 months postpartum.    She also mentions that her recent blood work showed slightly elevated liver enzymes.  She plans to address this with her primary care provider.    SOCIAL HISTORY  She does not use tobacco.    FAMILY HISTORY  She does not think there is any family history of bladder or kidney cancer.    Urinary symptoms/history:  Frequency-denies     Urgency-denies     Incontinence-denies     Nocturia-admits, 2 x per nigh t    GH-denies     History of stones-admits, age 16, was able to pass that stone      surgeries-denies     Family history of  malignancy-denies     Cardiopulmonary-denies     Anticoagulants-none     Smoker-denies     US ABDOMEN COMPLETE  Date of Exam: 4/22/2025 2:21 PM EDT     Indication: abdominal pain.     Comparison: CT 4/14/2025     Technique: Routine gray scale and color Doppler imaging of the complete abdomen was performed according to routine protocol.        Findings:  Visualized pancreas demonstrates no acute finding. The liver has a normal echotexture. Hyperechoic lesion measuring about 1.5 cm is seen in the anterior left liver. No other liver lesions. Liver measures 13 cm in maximal length. Portal venous flow is   normal. Hepatic veins are patent. The gallbladder demonstrates no acute finding. Common duct measures 3 mm. The right kidney is normal measuring 11.2 cm in length. No hydronephrosis seen. Aorta appears within normal limits measuring up to about 1.5 cm.   Spleen appears normal measuring up to 8.9 cm in length. The left kidney measures 10.7 cm in length without significant hydronephrosis. Bladder appears within normal limits. Bilateral ureteral jets are evident. No significant ascites.      IMPRESSION:  Impression:  1.5 cm hypoechoic lesion in the anterior left liver could reflect focal fat or hemangioma. No suspicious lesions are seen on the recent CT 4/14/2025.     No other acute findings. The mild bilateral hydronephrosis right greater than left seen on the recent CT is not as well seen on today's exam.     Electronically Signed: Jus Mcintyre MD    4/22/2025 3:06 PM EDT     CT ABDOMEN PELVIS W CONTRAST  Date of Exam: 4/14/2025 1:21 PM EDT     Indication: flank pain, dysuria.     Comparison: 4/11/2025     Technique: Axial CT images were obtained of the abdomen and pelvis after the uneventful intravenous administration of iodinated contrast. Reconstructed coronal and sagittal images were also obtained. Automated exposure control and iterative construction   methods were used.        Findings:  LUNG BASES:  Unremarkable without mass or infiltrate.     LIVER:  Unremarkable parenchyma without focal lesion.  BILIARY/GALLBLADDER:  Unremarkable  SPLEEN:  Unremarkable  PANCREAS:  Unremarkable  ADRENAL:  Unremarkable  KIDNEYS:  Unremarkable parenchyma with no solid mass identified. There is minimal bilateral hydroureteronephrosis, right worse than left. No calculus identified.  GASTROINTESTINAL/MESENTERY:  No evidence of obstruction nor inflammation.    MESENTERIC VESSELS:  Patent.  AORTA/IVC:  Normal caliber.     RETROPERITONEUM/LYMPH NODES:  Unremarkable     REPRODUCTIVE:  Unremarkable  BLADDER:  Unremarkable     OSSEUS STRUCTURES:  Typical for age with no acute process identified.     There is minimal free fluid in the pelvis.        IMPRESSION:  Impression:  1.Minimal hydroureteronephrosis without etiology identified. Findings are similar if not slightly improved.  2.Nonspecific free fluid in the pelvis.  3.No urinary bladder inflammatory changes are noted currently. No findings to suggest pyelonephritis.     Electronically Signed: Robson Lassiter MD    4/14/2025 1:41 PM EDT     CT ABDOMEN PELVIS W  CONTRAST-  Date of exam: 4/11/2025 12:18 AM.     Indication: lower abd. pain     Comparison: None available.     TECHNIQUE:  Axial CT images were obtained of the abdomen and pelvis following the  uneventful intravenous administration of 80 mL (or less) of Isovue-370  contrast agent. Reconstructed 2D (two-dimensional) coronal and sagittal  images were also obtained. Automated exposure control and iterative  construction methods were used. Additionally, the radiation dose  reduction device was turned on for each scan per the ALARA (As Low as  Reasonably Achievable) protocol. No oral contrast agent was administered  for the study. Please see the electronic medical record, EMR (i.e.,  Epic), for the documented dose of intravenous contrast agent as well as  the radiation dose.     FINDINGS:   An age-indeterminate infectious/inflammatory cystitis cannot be  excluded. The urinary bladder is mildly distended. No urinary bladder  calculi are appreciated. There is mild-to-moderate bilateral  hydronephrosis and hydroureter without an obstructing ureteral calculus.  These findings may be related to vesicoureteral reflux disease. There  may be bilateral infectious/inflammatory ureteritis, greater on the  right than the left. No definite pyelonephritis is seen. Please  correlate clinically. No intrarenal abscess is suggested. No perinephric  fluid collection is seen. No acute retroperitoneal or intraperitoneal  hematoma. No definite nonobstructing nephrolithiasis by enhanced CT.     Additionally, there is at least a moderate stool burden. Fecal stasis  (or fecal retention) as with constipation is possible. Fecal impaction  cannot be excluded. No mechanical bowel obstruction. No pathologic bowel  wall thickening. No pneumoperitoneum or pneumatosis. No portal or  mesenteric venous gas. No acute appendicitis, colitis, or  diverticulitis.      The gallbladder is contracted. No acute cholecystitis or pancreatitis.  No gallstones. No  biliary ductal dilatation.   No ascites. No aneurysmal dilatation of the aortoiliac arterial system.  No enlarged intra-abdominal or intrapelvic lymph nodes. No suspicious  uterine or adnexal mass. No adrenal mass. No acute findings are seen  with regard to the liver or spleen. No acute fracture. No aggressive  osseous lesion. No acute infiltrate is seen in the partially imaged lung  bases.         IMPRESSION:  1.  Possible age-indeterminate infectious/inflammatory cystitis.   2.  Bilateral ureteritis is possible, as well.   3.  There is mild-to-moderate hydronephrosis. Vesicoureteral reflux  disease is possible.   4.  Probably no acute pyelonephritis.   5.  No obstructing ureteral calculus is identified.   6.  No other acute findings are seen in the abdomen or pelvis by  enhanced CT examination.   7.  Please see above comments for further detail.        Portions of this note were completed with a voice recognition program.     4/11/2025 12:34 AM by Wiley Ewing MD on Workstation: [id number]       Objective     Past Medical History:   Diagnosis Date    Allergy to gluten     Gastritis 2024    GDM (gestational diabetes mellitus)     GDM (gestational diabetes mellitus)     GERD (gastroesophageal reflux disease) Jan 2023    Gestational hypertension 3/3/2025    Raynaud's phenomenon        Past Surgical History:   Procedure Laterality Date    COLONOSCOPY  03/26/2024    COLONOSCOPY N/A 3/26/2024    Procedure: COLONOSCOPY;  Surgeon: Darwin Cid MD;  Location: [facility] ENDOSCOPY;  Service: Gastroenterology;  Laterality: N/A;  NORMAL COLONOSCOPY    ENDOSCOPY  03/26/2024    ENDOSCOPY N/A 3/26/2024    Procedure: ESOPHAGOGASTRODUODENOSCOPY WITH BIOPSIES;  Surgeon: Darwin Cid MD;  Location: [facility] ENDOSCOPY;  Service: Gastroenterology;  Laterality: N/A;  NORMAL ENDOSCOPY         Current Outpatient Medications:     ferrous sulfate 325 (65 FE) MG tablet, Take 1 tablet by mouth Daily With Breakfast., Disp: 90  "tablet, Rfl: 1    Prenatal Vit-Fe Fumarate-FA (prenatal vitamin 27-0.8) 27-0.8 MG tablet tablet, Take  by mouth Daily. (Patient not taking: Reported on 5/28/2025), Disp: , Rfl:     Allergies   Allergen Reactions    Amoxicillin Rash    Gluten Meal GI Intolerance    Ultrasound Gel [Gyne-Moistrin] Itching and Rash     Outline of EFM on belly for 2 weeks after having it applied         Family History   Problem Relation Age of Onset    Prostate cancer Paternal Grandfather     Colon cancer Maternal Grandfather     Breast cancer Neg Hx     Ovarian cancer Neg Hx     Uterine cancer Neg Hx        Social History     Socioeconomic History    Marital status:      Spouse name: Mehran Parent    Number of children: 2   Tobacco Use    Smoking status: Never     Passive exposure: Never    Smokeless tobacco: Never   Vaping Use    Vaping status: Never Used   Substance and Sexual Activity    Alcohol use: Never    Drug use: Never    Sexual activity: Yes     Partners: Male     Birth control/protection: Condom       Vital Signs:   Resp 12   Ht 162.6 cm (64\")   Wt 47.6 kg (105 lb)   BMI 18.02 kg/m²      Physical Exam  Vitals and nursing note reviewed.   Constitutional:       General: She is not in acute distress.     Appearance: Normal appearance. She is not toxic-appearing.   Pulmonary:      Effort: Pulmonary effort is normal. No respiratory distress.   Neurological:      General: No focal deficit present.      Mental Status: She is alert and oriented to person, place, and time.          Result Review :   The following data was reviewed by: MICHAEL Cruz on 05/28/2025:  Results for orders placed or performed in visit on 05/28/25   Bladder Scan    Collection Time: 05/28/25 11:09 AM   Result Value Ref Range    Urine Volume 0       Bladder Scan interpretation 05/28/2025    Estimation of residual urine via BVI 3000 [place] Bladder Scan  MA/nurse performing: YASMEEN Venegas  Residual Urine: 0 ml  Indication: Hydronephrosis, " unspecified hydronephrosis type    History of kidney stones    History of UTI   Position: Supine  Examination: Incremental scanning of the suprapubic area using 2.0 MHz transducer using copious amounts of acoustic gel.   Findings: An anechoic area was demonstrated which represented the bladder, with measurement of residual urine as noted. I inspected this myself. In that the residual urine was stable or insignificant, refer to plan for treatment and plan necessary at this time.           Results  Laboratory Studies  Liver enzymes were slightly elevated.    Imaging  CT scan of abdomen and pelvis on 04/10/2025 showed possible infection of the bladder and ureters, mild to moderate hydronephrosis, no definite signs of kidney infection, and no kidney stones. A repeat CT scan done a few days later showed minimal hydroureteronephrosis, findings were similar if not slightly improved. Ultrasound of the abdomen on 04/22/2025 showed mild bilateral hydronephrosis that was previously seen on the CT is not as well seen on today's exam, no hydronephrosis in the right kidney, no significant hydronephrosis in the left kidney, and a small area on the liver that is probably just a hemangioma.        Procedures        Assessment and Plan    Diagnoses and all orders for this visit:    1. Hydronephrosis, unspecified hydronephrosis type (Primary)  -     Bladder Scan    2. History of kidney stones    3. History of UTI        Assessment & Plan  1. Abnormal CT scan.  Her symptoms have shown significant improvement. The most recent abdominal ultrasound results do not indicate any clinically significant findings or hydronephrosis.  Given the absence of major urinary issues and the resolution of previous infections, additional testing is not deemed necessary at this time. She is advised to monitor for any changes in her urinary symptoms, such as pain, burning sensation during urination, or hematuria, and to contact us immediately should these  occur.    2. Elevated liver enzymes.  She is advised to consult with her primary care physician regarding the elevated liver enzymes to determine if further testing is needed or if the levels have normalized.    Follow-up  The patient will follow up as needed.      Follow Up   Return if symptoms worsen or fail to improve.  Patient was given instructions and counseling regarding her condition or for health maintenance advice. Please see specific information pulled into the AVS if appropriate.         This document has been electronically signed by MICHAEL Cruz  May 28, 2025 13:07 EDT

## 2025-05-28 ENCOUNTER — OFFICE VISIT (OUTPATIENT)
Dept: UROLOGY | Age: 25
End: 2025-05-28
Payer: COMMERCIAL

## 2025-05-28 VITALS — BODY MASS INDEX: 17.93 KG/M2 | RESPIRATION RATE: 12 BRPM | WEIGHT: 105 LBS | HEIGHT: 64 IN

## 2025-05-28 DIAGNOSIS — N13.30 HYDRONEPHROSIS, UNSPECIFIED HYDRONEPHROSIS TYPE: Primary | ICD-10-CM

## 2025-05-28 DIAGNOSIS — Z87.442 HISTORY OF KIDNEY STONES: ICD-10-CM

## 2025-05-28 DIAGNOSIS — Z87.440 HISTORY OF UTI: ICD-10-CM

## 2025-05-28 LAB — URINE VOLUME: 0

## 2025-05-28 PROCEDURE — 1160F RVW MEDS BY RX/DR IN RCRD: CPT | Performed by: NURSE PRACTITIONER

## 2025-05-28 PROCEDURE — 99213 OFFICE O/P EST LOW 20 MIN: CPT | Performed by: NURSE PRACTITIONER

## 2025-05-28 PROCEDURE — 1159F MED LIST DOCD IN RCRD: CPT | Performed by: NURSE PRACTITIONER

## 2025-06-10 LAB
QT INTERVAL: 369 MS
QTC INTERVAL: 440 MS